# Patient Record
Sex: MALE | Race: WHITE | NOT HISPANIC OR LATINO | Employment: FULL TIME | URBAN - METROPOLITAN AREA
[De-identification: names, ages, dates, MRNs, and addresses within clinical notes are randomized per-mention and may not be internally consistent; named-entity substitution may affect disease eponyms.]

---

## 2023-12-11 ENCOUNTER — NURSE TRIAGE (OUTPATIENT)
Dept: NURSING | Facility: CLINIC | Age: 28
End: 2023-12-11

## 2023-12-11 LAB
ANION GAP SERPL CALCULATED.3IONS-SCNC: 11 MMOL/L (ref 7–15)
BASOPHILS # BLD AUTO: 0.1 10E3/UL (ref 0–0.2)
BASOPHILS NFR BLD AUTO: 1 %
BUN SERPL-MCNC: 14.9 MG/DL (ref 6–20)
CALCIUM SERPL-MCNC: 8.7 MG/DL (ref 8.6–10)
CHLORIDE SERPL-SCNC: 103 MMOL/L (ref 98–107)
CREAT SERPL-MCNC: 0.71 MG/DL (ref 0.67–1.17)
DEPRECATED HCO3 PLAS-SCNC: 23 MMOL/L (ref 22–29)
EGFRCR SERPLBLD CKD-EPI 2021: >90 ML/MIN/1.73M2
EOSINOPHIL # BLD AUTO: 0.2 10E3/UL (ref 0–0.7)
EOSINOPHIL NFR BLD AUTO: 2 %
ERYTHROCYTE [DISTWIDTH] IN BLOOD BY AUTOMATED COUNT: 11.9 % (ref 10–15)
GLUCOSE SERPL-MCNC: 100 MG/DL (ref 70–99)
HCT VFR BLD AUTO: 38.5 % (ref 40–53)
HGB BLD-MCNC: 13 G/DL (ref 13.3–17.7)
IMM GRANULOCYTES # BLD: 0.1 10E3/UL
IMM GRANULOCYTES NFR BLD: 1 %
LYMPHOCYTES # BLD AUTO: 4.7 10E3/UL (ref 0.8–5.3)
LYMPHOCYTES NFR BLD AUTO: 41 %
MCH RBC QN AUTO: 30.2 PG (ref 26.5–33)
MCHC RBC AUTO-ENTMCNC: 33.8 G/DL (ref 31.5–36.5)
MCV RBC AUTO: 90 FL (ref 78–100)
MONOCYTES # BLD AUTO: 1 10E3/UL (ref 0–1.3)
MONOCYTES NFR BLD AUTO: 8 %
NEUTROPHILS # BLD AUTO: 5.5 10E3/UL (ref 1.6–8.3)
NEUTROPHILS NFR BLD AUTO: 47 %
NRBC # BLD AUTO: 0 10E3/UL
NRBC BLD AUTO-RTO: 0 /100
PLATELET # BLD AUTO: 532 10E3/UL (ref 150–450)
POTASSIUM SERPL-SCNC: 3.9 MMOL/L (ref 3.4–5.3)
RBC # BLD AUTO: 4.3 10E6/UL (ref 4.4–5.9)
SODIUM SERPL-SCNC: 137 MMOL/L (ref 135–145)
WBC # BLD AUTO: 11.6 10E3/UL (ref 4–11)

## 2023-12-11 PROCEDURE — 36415 COLL VENOUS BLD VENIPUNCTURE: CPT | Performed by: EMERGENCY MEDICINE

## 2023-12-11 PROCEDURE — 99285 EMERGENCY DEPT VISIT HI MDM: CPT | Mod: 25

## 2023-12-11 PROCEDURE — 80048 BASIC METABOLIC PNL TOTAL CA: CPT | Performed by: EMERGENCY MEDICINE

## 2023-12-11 PROCEDURE — 87040 BLOOD CULTURE FOR BACTERIA: CPT | Performed by: EMERGENCY MEDICINE

## 2023-12-11 PROCEDURE — 85025 COMPLETE CBC W/AUTO DIFF WBC: CPT | Performed by: EMERGENCY MEDICINE

## 2023-12-12 ENCOUNTER — HOSPITAL ENCOUNTER (INPATIENT)
Facility: CLINIC | Age: 28
LOS: 1 days | Discharge: HOME | DRG: 855 | End: 2023-12-14
Attending: EMERGENCY MEDICINE | Admitting: INTERNAL MEDICINE
Payer: COMMERCIAL

## 2023-12-12 ENCOUNTER — APPOINTMENT (OUTPATIENT)
Dept: GENERAL RADIOLOGY | Facility: CLINIC | Age: 28
DRG: 855 | End: 2023-12-12
Attending: EMERGENCY MEDICINE
Payer: COMMERCIAL

## 2023-12-12 DIAGNOSIS — L03.011 CELLULITIS OF FINGER OF RIGHT HAND: ICD-10-CM
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
## 2023-12-12 DIAGNOSIS — I89.1 LYMPHANGITIS: ICD-10-CM

## 2023-12-12 LAB
ANION GAP SERPL CALCULATED.3IONS-SCNC: 10 MMOL/L (ref 7–15)
BASOPHILS # BLD AUTO: 0.1 10E3/UL (ref 0–0.2)
BASOPHILS NFR BLD AUTO: 1 %
BUN SERPL-MCNC: 10.8 MG/DL (ref 6–20)
CALCIUM SERPL-MCNC: 8.3 MG/DL (ref 8.6–10)
CHLORIDE SERPL-SCNC: 105 MMOL/L (ref 98–107)
CREAT SERPL-MCNC: 0.64 MG/DL (ref 0.67–1.17)
DEPRECATED HCO3 PLAS-SCNC: 25 MMOL/L (ref 22–29)
EGFRCR SERPLBLD CKD-EPI 2021: >90 ML/MIN/1.73M2
EOSINOPHIL # BLD AUTO: 0.2 10E3/UL (ref 0–0.7)
EOSINOPHIL NFR BLD AUTO: 2 %
ERYTHROCYTE [DISTWIDTH] IN BLOOD BY AUTOMATED COUNT: 11.9 % (ref 10–15)
GLUCOSE SERPL-MCNC: 116 MG/DL (ref 70–99)
HCT VFR BLD AUTO: 36.9 % (ref 40–53)
HGB BLD-MCNC: 12.5 G/DL (ref 13.3–17.7)
IMM GRANULOCYTES # BLD: 0.1 10E3/UL
IMM GRANULOCYTES NFR BLD: 1 %
LACTATE SERPL-SCNC: 1.1 MMOL/L (ref 0.7–2)
LYMPHOCYTES # BLD AUTO: 3.7 10E3/UL (ref 0.8–5.3)
LYMPHOCYTES NFR BLD AUTO: 30 %
MCH RBC QN AUTO: 30.1 PG (ref 26.5–33)
MCHC RBC AUTO-ENTMCNC: 33.9 G/DL (ref 31.5–36.5)
MCV RBC AUTO: 89 FL (ref 78–100)
MONOCYTES # BLD AUTO: 1.1 10E3/UL (ref 0–1.3)
MONOCYTES NFR BLD AUTO: 9 %
NEUTROPHILS # BLD AUTO: 6.9 10E3/UL (ref 1.6–8.3)
NEUTROPHILS NFR BLD AUTO: 57 %
NRBC # BLD AUTO: 0 10E3/UL
NRBC BLD AUTO-RTO: 0 /100
PLATELET # BLD AUTO: 497 10E3/UL (ref 150–450)
POTASSIUM SERPL-SCNC: 3.7 MMOL/L (ref 3.4–5.3)
RBC # BLD AUTO: 4.15 10E6/UL (ref 4.4–5.9)
SODIUM SERPL-SCNC: 140 MMOL/L (ref 135–145)
WBC # BLD AUTO: 12 10E3/UL (ref 4–11)

## 2023-12-12 PROCEDURE — G0378 HOSPITAL OBSERVATION PER HR: HCPCS

## 2023-12-12 PROCEDURE — 96365 THER/PROPH/DIAG IV INF INIT: CPT

## 2023-12-12 PROCEDURE — 36415 COLL VENOUS BLD VENIPUNCTURE: CPT | Performed by: INTERNAL MEDICINE

## 2023-12-12 PROCEDURE — 250N000011 HC RX IP 250 OP 636: Mod: JZ | Performed by: INTERNAL MEDICINE

## 2023-12-12 PROCEDURE — 83605 ASSAY OF LACTIC ACID: CPT | Performed by: EMERGENCY MEDICINE

## 2023-12-12 PROCEDURE — 87040 BLOOD CULTURE FOR BACTERIA: CPT | Performed by: EMERGENCY MEDICINE

## 2023-12-12 PROCEDURE — 250N000011 HC RX IP 250 OP 636: Performed by: EMERGENCY MEDICINE

## 2023-12-12 PROCEDURE — 73140 X-RAY EXAM OF FINGER(S): CPT | Mod: RT

## 2023-12-12 PROCEDURE — 99222 1ST HOSP IP/OBS MODERATE 55: CPT | Performed by: INTERNAL MEDICINE

## 2023-12-12 PROCEDURE — 250N000013 HC RX MED GY IP 250 OP 250 PS 637: Performed by: INTERNAL MEDICINE

## 2023-12-12 PROCEDURE — 85025 COMPLETE CBC W/AUTO DIFF WBC: CPT | Performed by: INTERNAL MEDICINE

## 2023-12-12 PROCEDURE — 96375 TX/PRO/DX INJ NEW DRUG ADDON: CPT

## 2023-12-12 PROCEDURE — 96376 TX/PRO/DX INJ SAME DRUG ADON: CPT

## 2023-12-12 PROCEDURE — 36415 COLL VENOUS BLD VENIPUNCTURE: CPT | Performed by: EMERGENCY MEDICINE

## 2023-12-12 PROCEDURE — 80048 BASIC METABOLIC PNL TOTAL CA: CPT | Performed by: INTERNAL MEDICINE

## 2023-12-12 RX ORDER — ACETAMINOPHEN 325 MG/1
650 TABLET ORAL EVERY 4 HOURS PRN
Status: DISCONTINUED | OUTPATIENT
Start: 2023-12-12 | End: 2023-12-14 | Stop reason: HOSPADM

## 2023-12-12 RX ORDER — AMOXICILLIN 250 MG
2 CAPSULE ORAL 2 TIMES DAILY PRN
Status: DISCONTINUED | OUTPATIENT
Start: 2023-12-12 | End: 2023-12-14 | Stop reason: HOSPADM

## 2023-12-12 RX ORDER — ONDANSETRON 2 MG/ML
4 INJECTION INTRAMUSCULAR; INTRAVENOUS EVERY 6 HOURS PRN
Status: DISCONTINUED | OUTPATIENT
Start: 2023-12-12 | End: 2023-12-13

## 2023-12-12 RX ORDER — OXYCODONE HYDROCHLORIDE 5 MG/1
5 TABLET ORAL EVERY 4 HOURS PRN
Status: DISCONTINUED | OUTPATIENT
Start: 2023-12-12 | End: 2023-12-13

## 2023-12-12 RX ORDER — AMPICILLIN AND SULBACTAM 2; 1 G/1; G/1
3 INJECTION, POWDER, FOR SOLUTION INTRAMUSCULAR; INTRAVENOUS EVERY 6 HOURS
Status: DISCONTINUED | OUTPATIENT
Start: 2023-12-12 | End: 2023-12-14 | Stop reason: HOSPADM

## 2023-12-12 RX ORDER — NALOXONE HYDROCHLORIDE 0.4 MG/ML
0.2 INJECTION, SOLUTION INTRAMUSCULAR; INTRAVENOUS; SUBCUTANEOUS
Status: DISCONTINUED | OUTPATIENT
Start: 2023-12-12 | End: 2023-12-14 | Stop reason: HOSPADM

## 2023-12-12 RX ORDER — ONDANSETRON 4 MG/1
4 TABLET, ORALLY DISINTEGRATING ORAL EVERY 6 HOURS PRN
Status: DISCONTINUED | OUTPATIENT
Start: 2023-12-12 | End: 2023-12-13

## 2023-12-12 RX ORDER — PIPERACILLIN SODIUM, TAZOBACTAM SODIUM 3; .375 G/15ML; G/15ML
3.38 INJECTION, POWDER, LYOPHILIZED, FOR SOLUTION INTRAVENOUS ONCE
Status: COMPLETED | OUTPATIENT
Start: 2023-12-12 | End: 2023-12-12

## 2023-12-12 RX ORDER — AMOXICILLIN 250 MG
1 CAPSULE ORAL 2 TIMES DAILY PRN
Status: DISCONTINUED | OUTPATIENT
Start: 2023-12-12 | End: 2023-12-14 | Stop reason: HOSPADM

## 2023-12-12 RX ORDER — NALOXONE HYDROCHLORIDE 0.4 MG/ML
0.4 INJECTION, SOLUTION INTRAMUSCULAR; INTRAVENOUS; SUBCUTANEOUS
Status: DISCONTINUED | OUTPATIENT
Start: 2023-12-12 | End: 2023-12-14 | Stop reason: HOSPADM

## 2023-12-12 RX ORDER — LIDOCAINE 40 MG/G
CREAM TOPICAL
Status: DISCONTINUED | OUTPATIENT
Start: 2023-12-12 | End: 2023-12-14

## 2023-12-12 RX ORDER — ACETAMINOPHEN 650 MG/1
650 SUPPOSITORY RECTAL EVERY 4 HOURS PRN
Status: DISCONTINUED | OUTPATIENT
Start: 2023-12-12 | End: 2023-12-14 | Stop reason: HOSPADM

## 2023-12-12 RX ADMIN — OXYCODONE HYDROCHLORIDE 5 MG: 5 TABLET ORAL at 06:30

## 2023-12-12 RX ADMIN — AMPICILLIN SODIUM AND SULBACTAM SODIUM 3 G: 2; 1 INJECTION, POWDER, FOR SOLUTION INTRAMUSCULAR; INTRAVENOUS at 17:51

## 2023-12-12 RX ADMIN — PIPERACILLIN AND TAZOBACTAM 3.38 G: 3; .375 INJECTION, POWDER, FOR SOLUTION INTRAVENOUS at 02:20

## 2023-12-12 RX ADMIN — ACETAMINOPHEN 650 MG: 325 TABLET, FILM COATED ORAL at 15:35

## 2023-12-12 RX ADMIN — AMPICILLIN SODIUM AND SULBACTAM SODIUM 3 G: 2; 1 INJECTION, POWDER, FOR SOLUTION INTRAMUSCULAR; INTRAVENOUS at 06:27

## 2023-12-12 RX ADMIN — ACETAMINOPHEN 650 MG: 325 TABLET, FILM COATED ORAL at 08:28

## 2023-12-12 RX ADMIN — ACETAMINOPHEN 650 MG: 325 TABLET, FILM COATED ORAL at 03:39

## 2023-12-12 RX ADMIN — AMPICILLIN SODIUM AND SULBACTAM SODIUM 3 G: 2; 1 INJECTION, POWDER, FOR SOLUTION INTRAMUSCULAR; INTRAVENOUS at 12:00

## 2023-12-12 ASSESSMENT — ACTIVITIES OF DAILY LIVING (ADL)
ADLS_ACUITY_SCORE: 19
ADLS_ACUITY_SCORE: 35
ADLS_ACUITY_SCORE: 35
ADLS_ACUITY_SCORE: 19
ADLS_ACUITY_SCORE: 19
ADLS_ACUITY_SCORE: 35
ADLS_ACUITY_SCORE: 19

## 2023-12-12 NOTE — ED NOTES
[facility]  ED Nurse Handoff Report    ED Chief complaint: Cat Bite  . ED Diagnosis:   Final diagnoses:   Cat bite, initial encounter   Cellulitis of finger of right hand   Lymphangitis       Allergies: No Known Allergies    Code Status: Full Code    Activity level - Baseline/Home:  independent.  Activity Level - Current:   independent.   Lift room needed: No.   Bariatric: No   Needed: No   Isolation: No.   Infection: Not Applicable.     Respiratory status: Room air    Vital Signs (within 30 minutes):   Vitals:    12/11/23 2124   BP: (!) 154/98   Pulse: 90   Resp: 20   Temp: 98.8  F (37.1  C)   TempSrc: Temporal   SpO2: 96%       Cardiac Rhythm:  ,      Pain level:    Patient confused: No.   Patient Falls Risk: nonskid shoes/slippers when out of bed.   Elimination Status: Has voided     Patient Report - Initial Complaint: infected cat bite.   Focused Assessment: cat bite, cellulitis, lymphangitis     Abnormal Results:   Labs Ordered and Resulted from Time of ED Arrival to Time of ED Departure   BASIC METABOLIC PANEL - Abnormal       Result Value    Sodium 137      Potassium 3.9      Chloride 103      Carbon Dioxide (CO2) 23      Anion Gap 11      Urea Nitrogen 14.9      Creatinine 0.71      GFR Estimate >90      Calcium 8.7      Glucose 100 (*)    CBC WITH PLATELETS AND DIFFERENTIAL - Abnormal    WBC Count 11.6 (*)     RBC Count 4.30 (*)     Hemoglobin 13.0 (*)     Hematocrit 38.5 (*)     MCV 90      MCH 30.2      MCHC 33.8      RDW 11.9      Platelet Count 532 (*)     % Neutrophils 47      % Lymphocytes 41      % Monocytes 8      % Eosinophils 2      % Basophils 1      % Immature Granulocytes 1      NRBCs per 100 WBC 0      Absolute Neutrophils 5.5      Absolute Lymphocytes 4.7      Absolute Monocytes 1.0      Absolute Eosinophils 0.2      Absolute Basophils 0.1      Absolute Immature Granulocytes 0.1      Absolute NRBCs 0.0     LACTIC ACID WHOLE BLOOD - Normal    Lactic Acid 1.1      BLOOD CULTURE   BLOOD CULTURE   BLOOD CULTURE        XR Finger Right G/E 2 Views   Final Result   IMPRESSION: Normal joint spaces and alignment. No fracture. Soft tissue swelling about the second digit. No soft tissue gas or radiopaque foreign body.             Treatments provided: iv abx, lab work  Family Comments:   OBS brochure/video discussed/provided to patient:  Yes  ED Medications:   Medications   lidocaine 1 % 0.1-1 mL (has no administration in time range)   lidocaine (LMX4) cream (has no administration in time range)   sodium chloride (PF) 0.9% PF flush 3 mL (3 mLs Intracatheter $Given 12/12/23 0222)   sodium chloride (PF) 0.9% PF flush 3 mL (has no administration in time range)   piperacillin-tazobactam (ZOSYN) 3.375 g vial to attach to  mL bag (0 g Intravenous Stopped 12/12/23 0306)       Drips infusing:  No  For the majority of the shift this patient was Green.   Interventions performed were .    Sepsis treatment initiated: No    Cares/treatment/interventions/medications to be completed following ED care:     ED Nurse Name: Kenia Alba RN  3:10 AM    RECEIVING UNIT ED HANDOFF REVIEW    Above ED Nurse Handoff Report was reviewed: Yes  Reviewed by: Fabiola Benedict RN on December 12, 2023 at 7:40 AM

## 2023-12-12 NOTE — ED TRIAGE NOTES
Patient received a cat bite to right index finger on 11/29/23. Was seen in UC on 12/01/23 and started a 10 day course of antibiotics. These are complete and the wound still has signs of infection: pain, swelling, redness. Had purulent drainage for several days which stopped 2 days ago. Has also noted some redness of the right bicep area.

## 2023-12-12 NOTE — TELEPHONE ENCOUNTER
Pt's spouse calling, verbal consent given to communicate.    Pt was bit by cat 2 weeks ago, was seen in , given antibiotics and tetanus vaccine.  Bite is still swollen on index finger. Tonight, notices Redness and tenderness on same arm from elbow to armpit. When extending arm, muscle feels tight.   Had a fever for 48 hours a week ago.  Currently no fever. Arm feels sore  Been feeling weak and having headache throughout the week.    Triage to ED, care advice given.    Agatha Hancock RN, BSN  12/11/2023 at 9:02 PM  [facility]        Reason for Disposition   [1] Any break in skin from BITE (e.g., cut, puncture or scratch) AND[2] PET animal (e.g., dog, cat, or ferret) at risk for RABIES (e.g., sick, stray, unprovoked bite, developing country)    Additional Information   Negative: [1] Major bleeding (e.g., actively dripping or spurting) AND [2] can't be stopped   Negative: Sounds like a life-threatening emergency to the triager   Negative: [1] Any break in skin from BITE (e.g., cut, puncture or scratch) AND[2] WILD animal at risk for RABIES (e.g., bat, raccoon, roberts, skunk, coyote, other carnivores; see Background for list.)    Protocols used: Animal Bite-A-

## 2023-12-12 NOTE — PHARMACY-ADMISSION MEDICATION HISTORY
Pharmacist Admission Medication History    Admission medication history is complete. The information provided in this note is only as accurate as the sources available at the time of the update.    Information Source(s): Patient via in-person  Pertinent Information: none    Changes made to PTA medication list:  Added: augmentin  Deleted: None  Changed: None    Medication Affordability:  Not including over the counter (OTC) medications, was there a time in the past 3 months when you did not take your medications as prescribed because of cost?: No    Allergies reviewed with patient and updates made in EHR: no  Medication History Completed By: Tunde Kaur RPH 12/12/2023 8:31 AM    Prior to Admission medications    Medication Sig Last Dose Taking? Auth Provider Long Term End Date   amoxicillin-clavulanate (AUGMENTIN) 875-125 MG tablet Take 1 tablet by mouth 2 times daily 12/11/2023 at day 9/10 Yes Unknown, Entered By History  12/12/23

## 2023-12-12 NOTE — CONSULTS
[facility]    Orthopedic Consultation    Reji Savage MRN# [id number]   Age: 28 year old YOB: 1995     Date of Admission:  12/12/2023    Reason for consult: Right index finger infection secondary to cat bite        Requesting provider: SELINA Long       Level of consult: Consult, follow and place orders           Assessment and Plan:   Assessment:   Right index finger infection secondary to cat bite, proximal lymphangitis        Plan:   Continue IV antibiotics  Begin bid hand soaks  Elevate hand when at rest  Will place NPO midnight and recheck in am            Chief Complaint:   Right index finger pain          History of Present Illness:   Reji Savage is a 28 year old male patient presented to emergency room with a complaint of right index finger swelling and pain after cat bite.  Patient stated that he was bitten by a stray kitten on his right index finger on 11/29/2023.  He states that he developed body aches and fever with right index finger swelling.  He also had discharge from the wound.  He states that purulent discharge stopped 2 days ago.  He continues to have swelling of right index finger.  He also complains of pain extending after his right arm with some red streaks.  Laboratory workup showed normal BMP.  WBC 11.6, hemoglobin 13.0.  X-ray of the right index finger showed normal joint space.  There is no fracture.  There is evidence of soft tissue swelling on the index finger.  There is no evidence of gas or foreign body.          Past Medical History:   No past medical history on file.          Past Surgical History:   No past surgical history on file.          Social History:     Social History     Tobacco Use    Smoking status: Not on file    Smokeless tobacco: Not on file   Substance Use Topics    Alcohol use: Not on file             Family History:   No family history on file.          Immunizations:     VACCINE/DOSE   Diptheria   DPT   DTAP   HBIG  "  Hepatitis A   Hepatitis B   HIB   Influenza   Measles   Meningococcal   MMR   Mumps   Pneumococcal   Polio   Rubella   Small Pox   TDAP   Varicella   Zoster             Allergies:   No Known Allergies          Medications:     Current Facility-Administered Medications   Medication    acetaminophen (TYLENOL) tablet 650 mg    Or    acetaminophen (TYLENOL) Suppository 650 mg    ampicillin-sulbactam (UNASYN) 3 g vial to attach to  mL bag    lidocaine (LMX4) cream    lidocaine 1 % 0.1-1 mL    naloxone (NARCAN) injection 0.2 mg    Or    naloxone (NARCAN) injection 0.4 mg    Or    naloxone (NARCAN) injection 0.2 mg    Or    naloxone (NARCAN) injection 0.4 mg    ondansetron (ZOFRAN ODT) ODT tab 4 mg    Or    ondansetron (ZOFRAN) injection 4 mg    oxyCODONE (ROXICODONE) tablet 5 mg    oxyCODONE IR (ROXICODONE) half-tab 2.5 mg    senna-docusate (SENOKOT-S/PERICOLACE) 8.6-50 MG per tablet 1 tablet    Or    senna-docusate (SENOKOT-S/PERICOLACE) 8.6-50 MG per tablet 2 tablet    sodium chloride (PF) 0.9% PF flush 3 mL    sodium chloride (PF) 0.9% PF flush 3 mL             Review of Systems:   ROS:  10 point ROS neg other than the symptoms noted above in the HPI.            Physical Exam:   All vitals have been reviewed  Patient Vitals for the past 24 hrs:   BP Temp Temp src Pulse Resp SpO2 Height Weight   12/12/23 1245 128/75 98  F (36.7  C) Oral 60 16 98 % -- --   12/12/23 0812 126/79 -- -- 72 -- 97 % 1.956 m (6' 5\") (!) 151 kg (333 lb)   12/12/23 0756 126/70 99.1  F (37.3  C) Oral 76 16 98 % -- --   12/12/23 0342 136/69 99.1  F (37.3  C) Oral 76 16 95 % -- --   12/11/23 2124 (!) 154/98 98.8  F (37.1  C) Temporal 90 20 96 % -- --     No intake or output data in the 24 hours ending 12/12/23 1408      Physical Exam   Temp: 98  F (36.7  C) Temp src: Oral BP: 128/75 Pulse: 60   Resp: 16 SpO2: 98 % O2 Device: None (Room air)    Vital Signs with Ranges  Temp:  [98  F (36.7  C)-99.1  F (37.3  C)] 98  F (36.7  C)  Pulse:  " [60-90] 60  Resp:  [16-20] 16  BP: (126-154)/(69-98) 128/75  SpO2:  [95 %-98 %] 98 %  333 lbs 0 oz    Constitutional: Alert, appropriate, following commands.  HEENT: Head atraumatic normocephalic. Pupils equal round and reactive to light.  Respiratory: Unlabored breathing no audible wheeze  Cardiovascular: Regular rate and rhythm per pulses  GI: Abdomen non-distended.  Lymph/Hematologic: No lymphadenopathy in areas examined  Genitourinary:  No redmond  Skin: No rashes, no cyanosis, no edema.  Musculoskeletal: On exam of the right index finger: there is a bite jonathan present along the palmar side of the finger between the PIP and DIP joints.  No active drainage.  There is surrounding erythema.  Lymphangitis present along the proximal arm with tenderness.  No joint involvement.  Denies tenderness along the DIP and PIP.  Denies pain with resisted finger flexion and extension.  Sensation intact.  Brisk cap refill.    Neurologic: normal without focal findings, mental status, speech normal, alert and oriented x iii  Neuropsychiatric: stable            Data:   All laboratory data reviewed  Results for orders placed or performed during the hospital encounter of 12/12/23   XR Finger Right G/E 2 Views     Status: None    Narrative    EXAM: XR FINGER RIGHT G/E 2 VIEWS  LOCATION: [facility]  DATE: 12/12/2023    INDICATION: cat bite with infection  COMPARISON: None.      Impression    IMPRESSION: Normal joint spaces and alignment. No fracture. Soft tissue swelling about the second digit. No soft tissue gas or radiopaque foreign body.     Basic metabolic panel (BMP)     Status: Abnormal   Result Value Ref Range    Sodium 137 135 - 145 mmol/L    Potassium 3.9 3.4 - 5.3 mmol/L    Chloride 103 98 - 107 mmol/L    Carbon Dioxide (CO2) 23 22 - 29 mmol/L    Anion Gap 11 7 - 15 mmol/L    Urea Nitrogen 14.9 6.0 - 20.0 mg/dL    Creatinine 0.71 0.67 - 1.17 mg/dL    GFR Estimate >90 >60 mL/min/1.73m2    Calcium 8.7 8.6 -  10.0 mg/dL    Glucose 100 (H) 70 - 99 mg/dL   CBC with platelets and differential     Status: Abnormal   Result Value Ref Range    WBC Count 11.6 (H) 4.0 - 11.0 10e3/uL    RBC Count 4.30 (L) 4.40 - 5.90 10e6/uL    Hemoglobin 13.0 (L) 13.3 - 17.7 g/dL    Hematocrit 38.5 (L) 40.0 - 53.0 %    MCV 90 78 - 100 fL    MCH 30.2 26.5 - 33.0 pg    MCHC 33.8 31.5 - 36.5 g/dL    RDW 11.9 10.0 - 15.0 %    Platelet Count 532 (H) 150 - 450 10e3/uL    % Neutrophils 47 %    % Lymphocytes 41 %    % Monocytes 8 %    % Eosinophils 2 %    % Basophils 1 %    % Immature Granulocytes 1 %    NRBCs per 100 WBC 0 <1 /100    Absolute Neutrophils 5.5 1.6 - 8.3 10e3/uL    Absolute Lymphocytes 4.7 0.8 - 5.3 10e3/uL    Absolute Monocytes 1.0 0.0 - 1.3 10e3/uL    Absolute Eosinophils 0.2 0.0 - 0.7 10e3/uL    Absolute Basophils 0.1 0.0 - 0.2 10e3/uL    Absolute Immature Granulocytes 0.1 <=0.4 10e3/uL    Absolute NRBCs 0.0 10e3/uL   Lactic acid whole blood     Status: Normal   Result Value Ref Range    Lactic Acid 1.1 0.7 - 2.0 mmol/L   CBC with platelets and differential     Status: Abnormal   Result Value Ref Range    WBC Count 12.0 (H) 4.0 - 11.0 10e3/uL    RBC Count 4.15 (L) 4.40 - 5.90 10e6/uL    Hemoglobin 12.5 (L) 13.3 - 17.7 g/dL    Hematocrit 36.9 (L) 40.0 - 53.0 %    MCV 89 78 - 100 fL    MCH 30.1 26.5 - 33.0 pg    MCHC 33.9 31.5 - 36.5 g/dL    RDW 11.9 10.0 - 15.0 %    Platelet Count 497 (H) 150 - 450 10e3/uL    % Neutrophils 57 %    % Lymphocytes 30 %    % Monocytes 9 %    % Eosinophils 2 %    % Basophils 1 %    % Immature Granulocytes 1 %    NRBCs per 100 WBC 0 <1 /100    Absolute Neutrophils 6.9 1.6 - 8.3 10e3/uL    Absolute Lymphocytes 3.7 0.8 - 5.3 10e3/uL    Absolute Monocytes 1.1 0.0 - 1.3 10e3/uL    Absolute Eosinophils 0.2 0.0 - 0.7 10e3/uL    Absolute Basophils 0.1 0.0 - 0.2 10e3/uL    Absolute Immature Granulocytes 0.1 <=0.4 10e3/uL    Absolute NRBCs 0.0 10e3/uL   Basic metabolic panel     Status: Abnormal   Result Value Ref  Range    Sodium 140 135 - 145 mmol/L    Potassium 3.7 3.4 - 5.3 mmol/L    Chloride 105 98 - 107 mmol/L    Carbon Dioxide (CO2) 25 22 - 29 mmol/L    Anion Gap 10 7 - 15 mmol/L    Urea Nitrogen 10.8 6.0 - 20.0 mg/dL    Creatinine 0.64 (L) 0.67 - 1.17 mg/dL    GFR Estimate >90 >60 mL/min/1.73m2    Calcium 8.3 (L) 8.6 - 10.0 mg/dL    Glucose 116 (H) 70 - 99 mg/dL   CBC + differential     Status: Abnormal    Narrative    The following orders were created for panel order CBC + differential.  Procedure                               Abnormality         Status                     ---------                               -----------         ------                     CBC with platelets and d...[[id number]]  Abnormal            Final result                 Please view results for these tests on the individual orders.   CBC with platelets differential     Status: Abnormal    Narrative    The following orders were created for panel order CBC with platelets differential.  Procedure                               Abnormality         Status                     ---------                               -----------         ------                     CBC with platelets and d...[[id number]]  Abnormal            Final result                 Please view results for these tests on the individual orders.          Attestation:  I have reviewed today's vital signs, notes, medications, labs and imaging with Dr. Kwan.  Amount of time performed on this consult: 45 minutes.    Aura Brock PA-C

## 2023-12-12 NOTE — ED PROVIDER NOTES
History     Chief Complaint:  Cat Bite       HPI   Reji Savage is a 28 year old male who was bitten by a stray kitten on 11/29/2023 he was bit on his right second finger over the PIP joint he developed fever body aches the cat was taken to a vet and was observed.  The patient went to urgent care on the 12th started Augmentin reported he had a fever a week ago and then again on Friday he has noticed red streaks traveling up his right affected arm has had malaise fevers and therefore presented to the ER.  He is tolerating pain from the finger he has not had an x-ray of that joint.      Independent Historian:    Significant other    Review of External Notes:  Urgent care note dated 12/2/2023    Medications:    Finish course of Augmentin    Past Medical History:    Cat bite        Physical Exam   Patient Vitals for the past 24 hrs:   BP Temp Temp src Pulse Resp SpO2   12/11/23 2124 (!) 154/98 98.8  F (37.1  C) Temporal 90 20 96 %        Physical Exam  General:  No respiratory distress    Cardiovascular: Good pulses.    Respiratory: Breathing non labored.     Musculoskeletal: No tenderness.  He has no significant pain with passive flexion extension of his right second finger DIP.  He has limitation of flexion actively.    Skin: Over the palmar PIP there is an area of fluctuance redness does not extend distal nor proximal.  It is tender to palpation.  There is an area of redness extending from his right axilla down the ventral surface of his right arm.    Neurologic: He is alert with adequate strength    Psychiatric: Appropriate     Emergency Department Course       Imaging:  XR Finger Right G/E 2 Views   Final Result   IMPRESSION: Normal joint spaces and alignment. No fracture. Soft tissue swelling about the second digit. No soft tissue gas or radiopaque foreign body.           Report per radiology    Laboratory:  Labs Ordered and Resulted from Time of ED Arrival to Time of ED Departure   BASIC METABOLIC PANEL  - Abnormal       Result Value    Sodium 137      Potassium 3.9      Chloride 103      Carbon Dioxide (CO2) 23      Anion Gap 11      Urea Nitrogen 14.9      Creatinine 0.71      GFR Estimate >90      Calcium 8.7      Glucose 100 (*)    CBC WITH PLATELETS AND DIFFERENTIAL - Abnormal    WBC Count 11.6 (*)     RBC Count 4.30 (*)     Hemoglobin 13.0 (*)     Hematocrit 38.5 (*)     MCV 90      MCH 30.2      MCHC 33.8      RDW 11.9      Platelet Count 532 (*)     % Neutrophils 47      % Lymphocytes 41      % Monocytes 8      % Eosinophils 2      % Basophils 1      % Immature Granulocytes 1      NRBCs per 100 WBC 0      Absolute Neutrophils 5.5      Absolute Lymphocytes 4.7      Absolute Monocytes 1.0      Absolute Eosinophils 0.2      Absolute Basophils 0.1      Absolute Immature Granulocytes 0.1      Absolute NRBCs 0.0     LACTIC ACID WHOLE BLOOD - Normal    Lactic Acid 1.1     BLOOD CULTURE   BLOOD CULTURE   BLOOD CULTURE        Procedures       Emergency Department Course & Assessments:             Interventions:  Medications   lidocaine 1 % 0.1-1 mL (has no administration in time range)   lidocaine (LMX4) cream (has no administration in time range)   sodium chloride (PF) 0.9% PF flush 3 mL (3 mLs Intracatheter $Given 12/12/23 0222)   sodium chloride (PF) 0.9% PF flush 3 mL (has no administration in time range)   piperacillin-tazobactam (ZOSYN) 3.375 g vial to attach to  mL bag (3.375 g Intravenous $New Bag 12/12/23 0220)        Assessments:      Independent Interpretation (X-rays, CTs, rhythm strip):  I reviewed the x-ray and do not see signs of fracture nor foreign body    Consultations/Discussion of Management or Tests:  0 226 I discussed the case with Dr. Long the we reviewed imaging and antibiotic selection            Disposition:  The patient was discharged to home.     Impression & Plan        Medical Decision Making:  The patient does not look toxic and does not have an elevated lactic acid however is  concerning that he has had an elevation of his fever has clear signs of infection of the finger from a cat bite with lymphangitis spreading up his arms.  With the patient having high fever I was concerned that he has failed outpatient antibiotics.  I do not think he has infection of the flexor tendon sheath but he does require antibiotic coverage and x-ray started here was cultured and the patient was admitted in good condition.        Diagnosis:    ICD-10-CM    1. Cat bite, initial encounter  W55.01XA       2. Cellulitis of finger of right hand  L03.011       3. Lymphangitis  I89.1                     12/12/2023   Nile Lang MD Farnan, Christopher M, MD  12/12/23 0226

## 2023-12-12 NOTE — H&P
[facility]    History and Physical  Hospitalist       Date of Admission:  12/12/2023  Date of Service (when I saw the patient): 12/12/23    Assessment & Plan   Reji Savage is a 28 year old male patient with no significant past medical history came to emergency room with a complaint of right index finger swelling and pain after cat bite.  Patient stated that he was bitten by a stray kitten on his right index finger on 11/29/2023.  He states that he developed body aches and fever with right index finger swelling.  He also had discharge from the wound.  He states that purulent discharge stopped 2 days ago.  He continues to have swelling of right index finger.  He also complains of pain extending after his right arm with some red streaks.  In the ER, his vital signs showed temperature 98.8, pulse 90, blood pressure 154/98, oxygen saturation 96% on room air.  Laboratory workup showed normal BMP.  WBC 11.6, hemoglobin 13.0.  X-ray of the right index finger showed normal joint space.  There is no fracture.  There is evidence of soft tissue swelling on the index finger.  There is no evidence of gas or foreign body.  In the ER, he was given a dose of IV Zosyn.  He was admitted for further management.    Cat bite with right index finger infection with right upper extremity lymphangitis  Patient does have pain on right index finger with swelling.  No purulent discharge.  He has evidence of lymphangitis.  X-ray of right index finger shows soft tissue swelling without bony abnormalities.  Has mild leukocytosis.  Started on IV Zosyn in the ER.  Will put him on IV Unasyn 3 g IV every 6 hours.  Will order Tylenol and oral oxycodone as needed for pain      DVT Prophylaxis: Ambulate every shift  Code Status: Full Code    Disposition: Expected discharge in 1-2 days     Antione Long MD    Primary Care Physician   Physician No Ref-Primary    Chief Complaint   Right index finger pain, cat bite    History  is obtained from the patient    History of Present Illness   Reji Savage is a 28 year old male patient with no significant past medical history came to emergency room with a complaint of right index finger swelling and pain after cat bite.  Patient stated that he was bitten by a stray kitten on his right index finger on 11/29/2023.  He states that he developed body aches and fever with right index finger swelling.  He also had discharge from the wound.  He states that purulent discharge stopped 2 days ago.  He states that he was seen at urgent care on 12/01/2023 and started on 10-day course of Augmentin.  He states that he completed course of antibiotic.  He states that he still has pain and redness.  He also noticed red streaks extending to his right arm associated with pain.  He has no nausea, vomiting, abdominal pain, urinary symptoms.  He denies fever today.  On arrival to emergency room, his vital signs showed temperature 98.8, pulse 90, blood pressure 154/98, oxygen saturation 96% on room air.  Laboratory workup showed normal BMP.  WBC 11.6, hemoglobin 13.0.  X-ray of the right index finger showed normal joint space.  There is no fracture.  There is evidence of soft tissue swelling on the index finger.  There is no evidence of gas or foreign body.  In the ER, he was given a dose of IV Zosyn.  He was admitted for further management.    Past Medical History    I have reviewed this patient's medical history and updated it with pertinent information if needed.   No past medical history on file.    Past Surgical History   I have reviewed this patient's surgical history and updated it with pertinent information if needed.  No past surgical history on file.    Prior to Admission Medications   None     Allergies   No Known Allergies    Social History   I have reviewed this patient's social history and updated it with pertinent information if needed. Reji Savage      Family History   I have reviewed this  patient's family history and updated it with pertinent information if needed.   No family history on file.    Review of Systems   The 10 point Review of Systems is negative other than noted in the HPI or here. Right index finger pain    Physical Exam   Temp: 98.8  F (37.1  C) Temp src: Temporal BP: (!) 154/98 Pulse: 90   Resp: 20 SpO2: 96 % O2 Device: None (Room air)    Vital Signs with Ranges  Temp:  [98.8  F (37.1  C)] 98.8  F (37.1  C)  Pulse:  [90] 90  Resp:  [20] 20  BP: (154)/(98) 154/98  SpO2:  [96 %] 96 %  0 lbs 0 oz    GEN:  Alert, oriented x 3, appears comfortable, NAD.  HEENT:  Normocephalic/atraumatic, no scleral icterus, no nasal discharge, mouth moist.  CV:  Regular rate and rhythm, no murmur or JVD.  S1 + S2 noted, no S3 or S4.  LUNGS:  Clear to auscultation bilaterally without rales/rhonchi/wheezing/retractions.  Symmetric chest rise on inhalation noted.  ABD:  Active bowel sounds, soft, non-tender/non-distended.  No rebound/guarding/rigidity.  EXT:  No edema or cyanosis.  Swelling of right index finger  SKIN:  Dry to touch, no exanthems noted in the visualized areas.  NEURO:  Symmetric muscle strength, sensation to touch grossly intact.  No new focal deficits appreciated.    Data   Data reviewed today:  I personally reviewed no images or EKG's today.  Recent Labs   Lab 12/11/23  2158   WBC 11.6*   HGB 13.0*   MCV 90   *      POTASSIUM 3.9   CHLORIDE 103   CO2 23   BUN 14.9   CR 0.71   ANIONGAP 11   LOBITO 8.7   *       Recent Results (from the past 24 hour(s))   XR Finger Right G/E 2 Views    Narrative    EXAM: XR FINGER RIGHT G/E 2 VIEWS  LOCATION: [facility]  DATE: 12/12/2023    INDICATION: cat bite with infection  COMPARISON: None.      Impression    IMPRESSION: Normal joint spaces and alignment. No fracture. Soft tissue swelling about the second digit. No soft tissue gas or radiopaque foreign body.

## 2023-12-12 NOTE — PROGRESS NOTES
PRIMARY DIAGNOSIS: ACUTE PAIN/INFECTION  OUTPATIENT/OBSERVATION GOALS TO BE MET BEFORE DISCHARGE:  1. Pain Status: Improved-controlled with oral pain medications.    2. Return to near baseline physical activity: Yes    3. Cleared for discharge by consultants (if involved): No    Discharge Planner Nurse   Safe discharge environment identified: Yes  Barriers to discharge: Yes       Entered by: Fabiola Benedict RN 12/12/2023 4:32 PM  Pt admitted from ED today at 0830 with right index finger infection from a cat bite. He is A/O x4, up independent in the room. Pt is receiving Unasyn for ABX. R finger is red/swollen and bite jonathan seen on finger, also redness present to inside of RUE. Tylenol given for headache. VSS, afebrile.   Please review provider order for any additional goals.   Nurse to notify provider when observation goals have been met and patient is ready for discharge.

## 2023-12-12 NOTE — PROGRESS NOTES
PRIMARY DIAGNOSIS: ACUTE PAIN/INFECTION  OUTPATIENT/OBSERVATION GOALS TO BE MET BEFORE DISCHARGE:  1. Pain Status: Improved-controlled with oral pain medications.     2. Return to near baseline physical activity: Yes     3. Cleared for discharge by consultants (if involved): No        Discharge Planner Nurse   Safe discharge environment identified: Yes  Barriers to discharge: Yes    Pt admitted with right index finger infection from a cat bite. He is A/O x4, up independent in the room. Pt is receiving Unasyn for ABX. R finger is red/swollen and bite jonathan seen on finger, also redness present to inside of RUE. VSS, afebrile. Seen by Orthopedics who recommend soaking BID with warm water and Chlorhexidine. First soak completed. NPO at midnight.    Please review provider order for any additional goals.   Nurse to notify provider when observation goals have been met and patient is ready for discharge.

## 2023-12-13 ENCOUNTER — ANESTHESIA (OUTPATIENT)
Dept: SURGERY | Facility: CLINIC | Age: 28
DRG: 855 | End: 2023-12-13
Payer: COMMERCIAL

## 2023-12-13 ENCOUNTER — ANESTHESIA EVENT (OUTPATIENT)
Dept: SURGERY | Facility: CLINIC | Age: 28
DRG: 855 | End: 2023-12-13
Payer: COMMERCIAL

## 2023-12-13 LAB
ANION GAP SERPL CALCULATED.3IONS-SCNC: 9 MMOL/L (ref 7–15)
BUN SERPL-MCNC: 7.9 MG/DL (ref 6–20)
CALCIUM SERPL-MCNC: 8.3 MG/DL (ref 8.6–10)
CHLORIDE SERPL-SCNC: 103 MMOL/L (ref 98–107)
CREAT SERPL-MCNC: 0.67 MG/DL (ref 0.67–1.17)
DEPRECATED HCO3 PLAS-SCNC: 25 MMOL/L (ref 22–29)
EGFRCR SERPLBLD CKD-EPI 2021: >90 ML/MIN/1.73M2
ERYTHROCYTE [DISTWIDTH] IN BLOOD BY AUTOMATED COUNT: 11.9 % (ref 10–15)
GLUCOSE SERPL-MCNC: 110 MG/DL (ref 70–99)
GRAM STAIN RESULT: NORMAL
GRAM STAIN RESULT: NORMAL
HCT VFR BLD AUTO: 36.8 % (ref 40–53)
HGB BLD-MCNC: 12.3 G/DL (ref 13.3–17.7)
MCH RBC QN AUTO: 30.1 PG (ref 26.5–33)
MCHC RBC AUTO-ENTMCNC: 33.4 G/DL (ref 31.5–36.5)
MCV RBC AUTO: 90 FL (ref 78–100)
PLATELET # BLD AUTO: 466 10E3/UL (ref 150–450)
POTASSIUM SERPL-SCNC: 3.9 MMOL/L (ref 3.4–5.3)
RBC # BLD AUTO: 4.09 10E6/UL (ref 4.4–5.9)
SODIUM SERPL-SCNC: 137 MMOL/L (ref 135–145)
WBC # BLD AUTO: 15 10E3/UL (ref 4–11)

## 2023-12-13 PROCEDURE — 87205 SMEAR GRAM STAIN: CPT | Performed by: ORTHOPAEDIC SURGERY

## 2023-12-13 PROCEDURE — 250N000009 HC RX 250: Performed by: ORTHOPAEDIC SURGERY

## 2023-12-13 PROCEDURE — 360N000076 HC SURGERY LEVEL 3, PER MIN: Performed by: ORTHOPAEDIC SURGERY

## 2023-12-13 PROCEDURE — 0J9J0ZZ DRAINAGE OF RIGHT HAND SUBCUTANEOUS TISSUE AND FASCIA, OPEN APPROACH: ICD-10-PCS | Performed by: ORTHOPAEDIC SURGERY

## 2023-12-13 PROCEDURE — 250N000011 HC RX IP 250 OP 636: Mod: JZ | Performed by: INTERNAL MEDICINE

## 2023-12-13 PROCEDURE — 250N000011 HC RX IP 250 OP 636: Performed by: ORTHOPAEDIC SURGERY

## 2023-12-13 PROCEDURE — 96376 TX/PRO/DX INJ SAME DRUG ADON: CPT

## 2023-12-13 PROCEDURE — 0JBJ0ZZ EXCISION OF RIGHT HAND SUBCUTANEOUS TISSUE AND FASCIA, OPEN APPROACH: ICD-10-PCS | Performed by: ORTHOPAEDIC SURGERY

## 2023-12-13 PROCEDURE — 99233 SBSQ HOSP IP/OBS HIGH 50: CPT | Performed by: STUDENT IN AN ORGANIZED HEALTH CARE EDUCATION/TRAINING PROGRAM

## 2023-12-13 PROCEDURE — 258N000003 HC RX IP 258 OP 636: Performed by: PHYSICIAN ASSISTANT

## 2023-12-13 PROCEDURE — 120N000001 HC R&B MED SURG/OB

## 2023-12-13 PROCEDURE — 999N000141 HC STATISTIC PRE-PROCEDURE NURSING ASSESSMENT: Performed by: ORTHOPAEDIC SURGERY

## 2023-12-13 PROCEDURE — 250N000013 HC RX MED GY IP 250 OP 250 PS 637: Performed by: PHYSICIAN ASSISTANT

## 2023-12-13 PROCEDURE — 87070 CULTURE OTHR SPECIMN AEROBIC: CPT | Performed by: ORTHOPAEDIC SURGERY

## 2023-12-13 PROCEDURE — 36415 COLL VENOUS BLD VENIPUNCTURE: CPT | Performed by: STUDENT IN AN ORGANIZED HEALTH CARE EDUCATION/TRAINING PROGRAM

## 2023-12-13 PROCEDURE — 250N000011 HC RX IP 250 OP 636: Performed by: NURSE ANESTHETIST, CERTIFIED REGISTERED

## 2023-12-13 PROCEDURE — 710N000012 HC RECOVERY PHASE 2, PER MINUTE: Performed by: ORTHOPAEDIC SURGERY

## 2023-12-13 PROCEDURE — 250N000009 HC RX 250: Performed by: NURSE ANESTHETIST, CERTIFIED REGISTERED

## 2023-12-13 PROCEDURE — G0378 HOSPITAL OBSERVATION PER HR: HCPCS

## 2023-12-13 PROCEDURE — 250N000013 HC RX MED GY IP 250 OP 250 PS 637: Performed by: INTERNAL MEDICINE

## 2023-12-13 PROCEDURE — 80048 BASIC METABOLIC PNL TOTAL CA: CPT | Performed by: STUDENT IN AN ORGANIZED HEALTH CARE EDUCATION/TRAINING PROGRAM

## 2023-12-13 PROCEDURE — 85027 COMPLETE CBC AUTOMATED: CPT | Performed by: STUDENT IN AN ORGANIZED HEALTH CARE EDUCATION/TRAINING PROGRAM

## 2023-12-13 PROCEDURE — 87075 CULTR BACTERIA EXCEPT BLOOD: CPT | Performed by: ORTHOPAEDIC SURGERY

## 2023-12-13 PROCEDURE — 258N000003 HC RX IP 258 OP 636: Performed by: NURSE ANESTHETIST, CERTIFIED REGISTERED

## 2023-12-13 PROCEDURE — 272N000001 HC OR GENERAL SUPPLY STERILE: Performed by: ORTHOPAEDIC SURGERY

## 2023-12-13 PROCEDURE — 370N000017 HC ANESTHESIA TECHNICAL FEE, PER MIN: Performed by: ORTHOPAEDIC SURGERY

## 2023-12-13 RX ORDER — ONDANSETRON 2 MG/ML
INJECTION INTRAMUSCULAR; INTRAVENOUS PRN
Status: DISCONTINUED | OUTPATIENT
Start: 2023-12-13 | End: 2023-12-13

## 2023-12-13 RX ORDER — KETAMINE HYDROCHLORIDE 10 MG/ML
INJECTION INTRAMUSCULAR; INTRAVENOUS PRN
Status: DISCONTINUED | OUTPATIENT
Start: 2023-12-13 | End: 2023-12-13

## 2023-12-13 RX ORDER — SODIUM CHLORIDE, SODIUM LACTATE, POTASSIUM CHLORIDE, CALCIUM CHLORIDE 600; 310; 30; 20 MG/100ML; MG/100ML; MG/100ML; MG/100ML
INJECTION, SOLUTION INTRAVENOUS CONTINUOUS
Status: DISCONTINUED | OUTPATIENT
Start: 2023-12-13 | End: 2023-12-13 | Stop reason: HOSPADM

## 2023-12-13 RX ORDER — POLYETHYLENE GLYCOL 3350 17 G/17G
17 POWDER, FOR SOLUTION ORAL DAILY
Status: DISCONTINUED | OUTPATIENT
Start: 2023-12-14 | End: 2023-12-14 | Stop reason: HOSPADM

## 2023-12-13 RX ORDER — OXYCODONE HYDROCHLORIDE 5 MG/1
5 TABLET ORAL EVERY 4 HOURS PRN
Status: DISCONTINUED | OUTPATIENT
Start: 2023-12-13 | End: 2023-12-14 | Stop reason: HOSPADM

## 2023-12-13 RX ORDER — ONDANSETRON 2 MG/ML
4 INJECTION INTRAMUSCULAR; INTRAVENOUS EVERY 6 HOURS PRN
Status: DISCONTINUED | OUTPATIENT
Start: 2023-12-13 | End: 2023-12-14 | Stop reason: HOSPADM

## 2023-12-13 RX ORDER — ONDANSETRON 2 MG/ML
4 INJECTION INTRAMUSCULAR; INTRAVENOUS EVERY 30 MIN PRN
Status: DISCONTINUED | OUTPATIENT
Start: 2023-12-13 | End: 2023-12-13 | Stop reason: HOSPADM

## 2023-12-13 RX ORDER — GLYCOPYRROLATE 0.2 MG/ML
INJECTION, SOLUTION INTRAMUSCULAR; INTRAVENOUS PRN
Status: DISCONTINUED | OUTPATIENT
Start: 2023-12-13 | End: 2023-12-13

## 2023-12-13 RX ORDER — BUPIVACAINE HYDROCHLORIDE 5 MG/ML
INJECTION, SOLUTION EPIDURAL; INTRACAUDAL PRN
Status: DISCONTINUED | OUTPATIENT
Start: 2023-12-13 | End: 2023-12-13 | Stop reason: HOSPADM

## 2023-12-13 RX ORDER — SODIUM CHLORIDE, SODIUM LACTATE, POTASSIUM CHLORIDE, CALCIUM CHLORIDE 600; 310; 30; 20 MG/100ML; MG/100ML; MG/100ML; MG/100ML
INJECTION, SOLUTION INTRAVENOUS CONTINUOUS PRN
Status: DISCONTINUED | OUTPATIENT
Start: 2023-12-13 | End: 2023-12-13

## 2023-12-13 RX ORDER — FENTANYL CITRATE 50 UG/ML
50 INJECTION, SOLUTION INTRAMUSCULAR; INTRAVENOUS EVERY 5 MIN PRN
Status: DISCONTINUED | OUTPATIENT
Start: 2023-12-13 | End: 2023-12-13 | Stop reason: HOSPADM

## 2023-12-13 RX ORDER — HYDROMORPHONE HCL IN WATER/PF 6 MG/30 ML
0.4 PATIENT CONTROLLED ANALGESIA SYRINGE INTRAVENOUS
Status: DISCONTINUED | OUTPATIENT
Start: 2023-12-13 | End: 2023-12-14 | Stop reason: HOSPADM

## 2023-12-13 RX ORDER — OXYCODONE HYDROCHLORIDE 5 MG/1
10 TABLET ORAL EVERY 4 HOURS PRN
Status: DISCONTINUED | OUTPATIENT
Start: 2023-12-13 | End: 2023-12-14 | Stop reason: HOSPADM

## 2023-12-13 RX ORDER — CEFAZOLIN SODIUM 2 G/100ML
2 INJECTION, SOLUTION INTRAVENOUS EVERY 8 HOURS
Status: DISCONTINUED | OUTPATIENT
Start: 2023-12-13 | End: 2023-12-13 | Stop reason: ALTCHOICE

## 2023-12-13 RX ORDER — ONDANSETRON 4 MG/1
4 TABLET, ORALLY DISINTEGRATING ORAL EVERY 30 MIN PRN
Status: DISCONTINUED | OUTPATIENT
Start: 2023-12-13 | End: 2023-12-13 | Stop reason: HOSPADM

## 2023-12-13 RX ORDER — LIDOCAINE HYDROCHLORIDE 20 MG/ML
INJECTION, SOLUTION INFILTRATION; PERINEURAL PRN
Status: DISCONTINUED | OUTPATIENT
Start: 2023-12-13 | End: 2023-12-13

## 2023-12-13 RX ORDER — PROPOFOL 10 MG/ML
INJECTION, EMULSION INTRAVENOUS CONTINUOUS PRN
Status: DISCONTINUED | OUTPATIENT
Start: 2023-12-13 | End: 2023-12-13

## 2023-12-13 RX ORDER — HYDROMORPHONE HCL IN WATER/PF 6 MG/30 ML
0.4 PATIENT CONTROLLED ANALGESIA SYRINGE INTRAVENOUS EVERY 5 MIN PRN
Status: DISCONTINUED | OUTPATIENT
Start: 2023-12-13 | End: 2023-12-13 | Stop reason: HOSPADM

## 2023-12-13 RX ORDER — CALCIUM CARBONATE 500 MG/1
500 TABLET, CHEWABLE ORAL 4 TIMES DAILY PRN
Status: DISCONTINUED | OUTPATIENT
Start: 2023-12-13 | End: 2023-12-14 | Stop reason: HOSPADM

## 2023-12-13 RX ORDER — ACETAMINOPHEN 325 MG/1
650 TABLET ORAL EVERY 4 HOURS PRN
Qty: 100 TABLET | Refills: 0 | Status: SHIPPED | OUTPATIENT
Start: 2023-12-13

## 2023-12-13 RX ORDER — IBUPROFEN 600 MG/1
600 TABLET, FILM COATED ORAL EVERY 6 HOURS PRN
Status: DISCONTINUED | OUTPATIENT
Start: 2023-12-13 | End: 2023-12-14 | Stop reason: HOSPADM

## 2023-12-13 RX ORDER — OXYCODONE HYDROCHLORIDE 5 MG/1
10 TABLET ORAL
Status: DISCONTINUED | OUTPATIENT
Start: 2023-12-13 | End: 2023-12-13 | Stop reason: HOSPADM

## 2023-12-13 RX ORDER — SODIUM CHLORIDE, SODIUM LACTATE, POTASSIUM CHLORIDE, CALCIUM CHLORIDE 600; 310; 30; 20 MG/100ML; MG/100ML; MG/100ML; MG/100ML
INJECTION, SOLUTION INTRAVENOUS CONTINUOUS
Status: DISCONTINUED | OUTPATIENT
Start: 2023-12-13 | End: 2023-12-14 | Stop reason: HOSPADM

## 2023-12-13 RX ORDER — FENTANYL CITRATE 50 UG/ML
25 INJECTION, SOLUTION INTRAMUSCULAR; INTRAVENOUS EVERY 5 MIN PRN
Status: DISCONTINUED | OUTPATIENT
Start: 2023-12-13 | End: 2023-12-13 | Stop reason: HOSPADM

## 2023-12-13 RX ORDER — ASPIRIN 81 MG/1
81 TABLET ORAL 2 TIMES DAILY
Status: DISCONTINUED | OUTPATIENT
Start: 2023-12-13 | End: 2023-12-14 | Stop reason: HOSPADM

## 2023-12-13 RX ORDER — FENTANYL CITRATE 50 UG/ML
INJECTION, SOLUTION INTRAMUSCULAR; INTRAVENOUS PRN
Status: DISCONTINUED | OUTPATIENT
Start: 2023-12-13 | End: 2023-12-13

## 2023-12-13 RX ORDER — ONDANSETRON 4 MG/1
4 TABLET, ORALLY DISINTEGRATING ORAL EVERY 6 HOURS PRN
Status: DISCONTINUED | OUTPATIENT
Start: 2023-12-13 | End: 2023-12-14 | Stop reason: HOSPADM

## 2023-12-13 RX ORDER — LIDOCAINE 40 MG/G
CREAM TOPICAL
Status: DISCONTINUED | OUTPATIENT
Start: 2023-12-13 | End: 2023-12-14 | Stop reason: HOSPADM

## 2023-12-13 RX ORDER — PROPOFOL 10 MG/ML
INJECTION, EMULSION INTRAVENOUS PRN
Status: DISCONTINUED | OUTPATIENT
Start: 2023-12-13 | End: 2023-12-13

## 2023-12-13 RX ORDER — HYDROMORPHONE HCL IN WATER/PF 6 MG/30 ML
0.2 PATIENT CONTROLLED ANALGESIA SYRINGE INTRAVENOUS
Status: DISCONTINUED | OUTPATIENT
Start: 2023-12-13 | End: 2023-12-14 | Stop reason: HOSPADM

## 2023-12-13 RX ORDER — HYDROMORPHONE HCL IN WATER/PF 6 MG/30 ML
0.2 PATIENT CONTROLLED ANALGESIA SYRINGE INTRAVENOUS EVERY 5 MIN PRN
Status: DISCONTINUED | OUTPATIENT
Start: 2023-12-13 | End: 2023-12-13 | Stop reason: HOSPADM

## 2023-12-13 RX ORDER — IBUPROFEN 600 MG/1
600 TABLET, FILM COATED ORAL EVERY 6 HOURS PRN
Qty: 30 TABLET | Refills: 0 | Status: SHIPPED | OUTPATIENT
Start: 2023-12-13

## 2023-12-13 RX ORDER — OXYCODONE HYDROCHLORIDE 5 MG/1
5 TABLET ORAL
Status: DISCONTINUED | OUTPATIENT
Start: 2023-12-13 | End: 2023-12-13 | Stop reason: HOSPADM

## 2023-12-13 RX ORDER — BISACODYL 10 MG
10 SUPPOSITORY, RECTAL RECTAL DAILY PRN
Status: DISCONTINUED | OUTPATIENT
Start: 2023-12-13 | End: 2023-12-14 | Stop reason: HOSPADM

## 2023-12-13 RX ORDER — OXYCODONE HYDROCHLORIDE 5 MG/1
TABLET ORAL
Qty: 16 TABLET | Refills: 0 | Status: SHIPPED | OUTPATIENT
Start: 2023-12-13

## 2023-12-13 RX ORDER — PROCHLORPERAZINE MALEATE 10 MG
10 TABLET ORAL EVERY 6 HOURS PRN
Status: DISCONTINUED | OUTPATIENT
Start: 2023-12-13 | End: 2023-12-14 | Stop reason: HOSPADM

## 2023-12-13 RX ORDER — AMOXICILLIN 250 MG
1 CAPSULE ORAL 2 TIMES DAILY
Status: DISCONTINUED | OUTPATIENT
Start: 2023-12-13 | End: 2023-12-14 | Stop reason: HOSPADM

## 2023-12-13 RX ADMIN — AMPICILLIN SODIUM AND SULBACTAM SODIUM 3 G: 2; 1 INJECTION, POWDER, FOR SOLUTION INTRAMUSCULAR; INTRAVENOUS at 12:14

## 2023-12-13 RX ADMIN — ONDANSETRON 4 MG: 2 INJECTION INTRAMUSCULAR; INTRAVENOUS at 18:22

## 2023-12-13 RX ADMIN — ACETAMINOPHEN 650 MG: 325 TABLET, FILM COATED ORAL at 00:03

## 2023-12-13 RX ADMIN — PROPOFOL 150 MCG/KG/MIN: 10 INJECTION, EMULSION INTRAVENOUS at 18:22

## 2023-12-13 RX ADMIN — SENNOSIDES AND DOCUSATE SODIUM 1 TABLET: 8.6; 5 TABLET ORAL at 21:34

## 2023-12-13 RX ADMIN — SODIUM CHLORIDE, POTASSIUM CHLORIDE, SODIUM LACTATE AND CALCIUM CHLORIDE: 600; 310; 30; 20 INJECTION, SOLUTION INTRAVENOUS at 21:34

## 2023-12-13 RX ADMIN — MIDAZOLAM 2 MG: 1 INJECTION INTRAMUSCULAR; INTRAVENOUS at 18:20

## 2023-12-13 RX ADMIN — ASPIRIN 81 MG: 81 TABLET, COATED ORAL at 21:34

## 2023-12-13 RX ADMIN — PROPOFOL 30 MG: 10 INJECTION, EMULSION INTRAVENOUS at 18:52

## 2023-12-13 RX ADMIN — Medication 5 MG: at 18:54

## 2023-12-13 RX ADMIN — PROPOFOL 50 MG: 10 INJECTION, EMULSION INTRAVENOUS at 18:22

## 2023-12-13 RX ADMIN — GLYCOPYRROLATE 0.2 MG: 0.2 INJECTION, SOLUTION INTRAMUSCULAR; INTRAVENOUS at 18:24

## 2023-12-13 RX ADMIN — FENTANYL CITRATE 50 MCG: 50 INJECTION INTRAMUSCULAR; INTRAVENOUS at 18:22

## 2023-12-13 RX ADMIN — AMPICILLIN SODIUM AND SULBACTAM SODIUM 3 G: 2; 1 INJECTION, POWDER, FOR SOLUTION INTRAMUSCULAR; INTRAVENOUS at 00:02

## 2023-12-13 RX ADMIN — OXYCODONE HYDROCHLORIDE 5 MG: 5 TABLET ORAL at 23:38

## 2023-12-13 RX ADMIN — LIDOCAINE HYDROCHLORIDE 30 MG: 20 INJECTION, SOLUTION INFILTRATION; PERINEURAL at 18:22

## 2023-12-13 RX ADMIN — ACETAMINOPHEN 650 MG: 325 TABLET, FILM COATED ORAL at 23:46

## 2023-12-13 RX ADMIN — Medication 15 MG: at 18:22

## 2023-12-13 RX ADMIN — ACETAMINOPHEN 650 MG: 325 TABLET, FILM COATED ORAL at 14:11

## 2023-12-13 RX ADMIN — AMPICILLIN SODIUM AND SULBACTAM SODIUM 3 G: 2; 1 INJECTION, POWDER, FOR SOLUTION INTRAMUSCULAR; INTRAVENOUS at 18:00

## 2023-12-13 RX ADMIN — SODIUM CHLORIDE, POTASSIUM CHLORIDE, SODIUM LACTATE AND CALCIUM CHLORIDE: 600; 310; 30; 20 INJECTION, SOLUTION INTRAVENOUS at 17:53

## 2023-12-13 RX ADMIN — AMPICILLIN SODIUM AND SULBACTAM SODIUM 3 G: 2; 1 INJECTION, POWDER, FOR SOLUTION INTRAMUSCULAR; INTRAVENOUS at 06:40

## 2023-12-13 RX ADMIN — AMPICILLIN SODIUM AND SULBACTAM SODIUM 3 G: 2; 1 INJECTION, POWDER, FOR SOLUTION INTRAMUSCULAR; INTRAVENOUS at 23:46

## 2023-12-13 ASSESSMENT — ACTIVITIES OF DAILY LIVING (ADL)
ADLS_ACUITY_SCORE: 19

## 2023-12-13 NOTE — PROGRESS NOTES
PRIMARY DIAGNOSIS: ACUTE PAIN/INFECTION  OUTPATIENT/OBSERVATION GOALS TO BE MET BEFORE DISCHARGE:  1. Pain Status: Improved-controlled with oral pain medications.     2. Return to near baseline physical activity: Yes     3. Cleared for discharge by consultants (if involved): No        Discharge Planner Nurse   Safe discharge environment identified: Yes  Barriers to discharge: Yes     Pt admitted with right index finger infection from a cat bite. He is A/O x4, up independent in the room. Pt is receiving Unasyn for ABX. R finger is red/swollen and bite jonathan seen on finger, also redness present to inside of RUE. VSS, afebrile. Seen by Orthopedics who recommend soaking BID with warm water and Chlorhexidine. First soak completed at 1500. NPO at midnight. Tolerating regular diet. Orthopedics following.      Please review provider order for any additional goals.   Nurse to notify provider when observation goals have been met and patient is ready for discharge.

## 2023-12-13 NOTE — PROGRESS NOTES
"[facility]    Medicine Progress Note - Hospitalist Service    Date of Admission:  12/12/2023    Assessment & Plan       Cat bite of right index finger with puncture and swelling, cellulitis, lymphangitis   Sepsis 2/2 above   Bite occurred 11/29/23, developed body aches and fever with R index finger swelling and purulent discharge from wound; was seen at urgent care 12/1 and given and completed 10 day course of Augmentin, however then noted erythema extending up R arm with pain. XR with normal joint space, soft tissue present. Continues to have fever, leukocytosis; on unsayn with improvement of lymphangitic streaking but suspect need source control.   -ortho following, OR today  -continue unasyn, dependent on OR findings will then tailor as able       Diet: NPO per Anesthesia Guidelines for Procedure/Surgery Except for: Meds    DVT Prophylaxis: Low Risk/Ambulatory with no VTE prophylaxis indicated  Brown Catheter: Not present  Lines: None     Cardiac Monitoring: ACTIVE order. Indication: Procedural area  Code Status: Full Code      Clinically Significant Risk Factors Present on Admission                       # Obesity: Estimated body mass index is 39.49 kg/m  as calculated from the following:    Height as of this encounter: 1.956 m (6' 5\").    Weight as of this encounter: 151 kg (333 lb).              Disposition Plan      Expected Discharge Date: 12/15/2023                  Mgegan Traore MD  Hospitalist Service  [facility]  Securely message with [email or web address] (more info)  Text page via [employer] Paging/Directory   ______________________________________________________________________    Interval History   Redness improving, still having pain and sweling, fever    Physical Exam   Vital Signs: Temp: 100.2  F (37.9  C) Temp src: Tympanic BP: 136/87 Pulse: 95   Resp: 20 SpO2: 96 % O2 Device: None (Room air)    Weight: 333 lbs 0 oz    General: Very pleasant male , NAD  HEENT:  " Sclerae anicteric.  Mucous membranes moist.  Cardiac: Regular rate and rhythm without murmur.  no peripheral edema.  Respiratory: Normal work of breathing.  Musculoskeletal: puncutre wound present on palmar aspect right index finger, with swelling extending between pip and dip joints and decreased flexion both active and passively  Skin: erythema still present up medial aspect of arm to trunk but improved from prior  Neurologic: Alert. No gross focal deficits.   Psychologic: Appropriate mood and affect.        Medical Decision Making       50 MINUTES SPENT BY ME on the date of service doing chart review, history, exam, documentation & further activities per the note.      Data     I have personally reviewed the following data over the past 24 hrs:    15.0 (H)  \   12.3 (L)   / 466 (H)     137 103 7.9 /  110 (H)   3.9 25 0.67 \

## 2023-12-13 NOTE — PROGRESS NOTES
Orthopedic Surgery  Rejirekha Lutzt  12/13/2023     Admit Date:  12/12/2023    Right index finger infection secondary to cat bite.      Patient resting comfortably in bed.    States pain similar to yesterday   NPO for possible procedure.   Denies nausea or vomiting  Denies chest pain or shortness of breath  102 fever overnight    Temp:  [98  F (36.7  C)-102.2  F (39  C)] 102.2  F (39  C)  Pulse:  [] 89  Resp:  [16-18] 18  BP: (128-142)/(63-83) 131/70  SpO2:  [90 %-100 %] 96 %    On exam of the right index finger: there is a bite jonathan present along the palmar side of the finger between the PIP and DIP joints.  No active drainage.  There is surrounding erythema, slightly improved vs yesterday.  Lymphangitis remains present along the proximal arm with tenderness.  No joint involvement.  Denies tenderness along the DIP and PIP.  Denies pain with resisted finger flexion and extension.  Sensation intact.  Brisk cap refill.       Labs:  Recent Labs   Lab Test 12/13/23  0700 12/12/23  0349 12/11/23  2158   WBC 15.0* 12.0* 11.6*   HGB 12.3* 12.5* 13.0*   * 497* 532*       1. PLAN:   Given fever of 102+ and increasing WBC, and continued lymphangitis, patient is scheduled for an I&D of the right index finger later today.     Surgeon: Dr Kwan   NPO effective now   Continue IV Abx and hand soaks     2. Disposition   Anticipate d/c to home 1-2 days pending Abx plan     Aura Brock PA-C

## 2023-12-13 NOTE — PLAN OF CARE
"PRIMARY DIAGNOSIS: ACUTE PAIN/Infection  OUTPATIENT/OBSERVATION GOALS TO BE MET BEFORE DISCHARGE:  1. Pain Status: Pain free.    2. Return to near baseline physical activity: Yes    3. Cleared for discharge by consultants (if involved): No    Discharge Planner Nurse   Safe discharge environment identified: Yes  Barriers to discharge: Yes       Entered by: Iram Mcelroy RN 12/12/2023 9:41 PM  A&Ox4, denies pain, reg diet, NPO at 12am, stable on RA, wife at bedside.   BP (!) 142/77   Pulse 83   Temp 98.7  F (37.1  C) (Oral)   Resp 16   Ht 1.956 m (6' 5\")   Wt (!) 151 kg (333 lb)   SpO2 98%   BMI 39.49 kg/m       Please review provider order for any additional goals.   Nurse to notify provider when observation goals have been met and patient is ready for discharge.  "

## 2023-12-13 NOTE — PLAN OF CARE
"PRIMARY DIAGNOSIS: ACUTE PAIN/Infection  OUTPATIENT/OBSERVATION GOALS TO BE MET BEFORE DISCHARGE:  1. Pain Status: Pain free.    2. Return to near baseline physical activity: Yes    3. Cleared for discharge by consultants (if involved): No    Discharge Planner Nurse   Safe discharge environment identified: Yes  Barriers to discharge: Yes       Entered by: Iram Mcelroy RN 12/13/2023 1:20 AM  A&Ox4, denies pain,NPO, wound cares completed, stable on RA, Ind in room, PIV SL, wife at bedside, temp 100.5, tylenol given.   BP (!) 142/77   Pulse 109   Temp (!) 100.5  F (38.1  C) (Oral)   Resp 18   Ht 1.956 m (6' 5\")   Wt (!) 151 kg (333 lb)   SpO2 98%   BMI 39.49 kg/m       Please review provider order for any additional goals.   Nurse to notify provider when observation goals have been met and patient is ready for discharge.  "

## 2023-12-13 NOTE — UTILIZATION REVIEW
Admission Status; Secondary Review Determination         Under the authority of the Utilization Management Committee, the utilization review process indicated a secondary review on the above patient.  The review outcome is based on review of the medical records, discussions with staff, and applying clinical experience noted on the date of the review.        (x)      Inpatient Status Appropriate - This patient's medical care is consistent with medical management for inpatient care and reasonable inpatient medical practice.     RATIONALE FOR DETERMINATION   The patient is a 28-year-old male admitted on 12/12/2023.  Patient came to the ED because of right index finger swelling and pain after a cat bite.  He was bitten by a stray kitten on his right index finger on 11/29/2023.  He developed body aches with fever.  There was purulent drainage is stopped 2 days ago.  Swelling continued.  Initial white count was 11.6.  Patient was started on broad-spectrum antibiotics and evaluated by hand surgery.  Patient is continued to worsen with 102 fever in the past 24 hours and white count is increased to 15,000.  Because of worsening clinical condition with systemic findings despite IV Zosyn, the patient will likely be taken to the OR later today and is currently NPO.  Based on worsening infection despite appropriate treatment, recommend changing from observation to inpatient status.  Aura Brock PA-C will be advised of this recommendation via [facility].      The severity of illness, intensity of service provided, expected LOS and risk for adverse outcome make the care complex, high risk and appropriate for hospital admission.        The information on this document is developed by the utilization review team in order for the business office to ensure compliance.  This only denotes the appropriateness of proper admission status and does not reflect the quality of care rendered.         The definitions of Inpatient Status and  Observation Status used in making the determination above are those provided in the CMS Coverage Manual, Chapter 1 and Chapter 6, section 70.4.      Sincerely,     Alek Donaldson MD  Physician Advisor  Utilization Review/ Case Management  [facility].

## 2023-12-13 NOTE — PLAN OF CARE
PRIMARY DIAGNOSIS: ACUTE PAIN/Infection  OUTPATIENT/OBSERVATION GOALS TO BE MET BEFORE DISCHARGE:  1. Pain Status: Pain free.    2. Return to near baseline physical activity: Yes    3. Cleared for discharge by consultants (if involved): No    Discharge Planner Nurse   Safe discharge environment identified: Yes  Barriers to discharge: Yes       Entered by: Iram Mcelroy RN 12/13/2023 4:20 AM  A&Ox4, denies pain,N/V, NPO diet started at midnight, BID chlorhexidine soak completed, stable on RA, LS clear, Ind in room, PIV SL, wife at bedside, PRN tylenol given for temp. Abx therapy, hand elevation encouraged, anticipating discharge home 12/13 if medically stable.     Please review provider order for any additional goals.   Nurse to notify provider when observation goals have been met and patient is ready for discharge.

## 2023-12-13 NOTE — ANESTHESIA PREPROCEDURE EVALUATION
Anesthesia Pre-Procedure Evaluation    Patient: Reji Savage   MRN: [id number] : 1995        Procedure : Procedure(s):  IRRIGATION AND DEBRIDEMENT RIGHT INDEX FINGER          History reviewed. No pertinent past medical history.   History reviewed. No pertinent surgical history.   No Known Allergies   Social History     Tobacco Use    Smoking status: Never    Smokeless tobacco: Never   Substance Use Topics    Alcohol use: Not on file      Wt Readings from Last 1 Encounters:   23 (!) 151 kg (333 lb)        Anesthesia Evaluation            ROS/MED HX  ENT/Pulmonary:  - neg pulmonary ROS     Neurologic:  - neg neurologic ROS     Cardiovascular:  - neg cardiovascular ROS     METS/Exercise Tolerance: >4 METS    Hematologic:  - neg hematologic  ROS     Musculoskeletal:  - neg musculoskeletal ROS     GI/Hepatic:  - neg GI/hepatic ROS     Renal/Genitourinary:  - neg Renal ROS     Endo: Comment: .Body mass index is 39.49 kg/m .        Psychiatric/Substance Use:  - neg psychiatric ROS     Infectious Disease: Comment: Cat bite on finger      Malignancy:  - neg malignancy ROS     Other:  - neg other ROS          Physical Exam    Airway        Mallampati: II    Neck ROM: full     Respiratory Devices and Support         Dental           Cardiovascular   cardiovascular exam normal       Rhythm and rate: regular     Pulmonary   pulmonary exam normal                OUTSIDE LABS:  CBC:   Lab Results   Component Value Date    WBC 15.0 (H) 2023    WBC 12.0 (H) 2023    HGB 12.3 (L) 2023    HGB 12.5 (L) 2023    HCT 36.8 (L) 2023    HCT 36.9 (L) 2023     (H) 2023     (H) 2023     BMP:   Lab Results   Component Value Date     2023     2023    POTASSIUM 3.9 2023    POTASSIUM 3.7 2023    CHLORIDE 103 2023    CHLORIDE 105 2023    CO2 25 2023    CO2 25 2023    BUN 7.9 2023    BUN 10.8 2023     "CR 0.67 12/13/2023    CR 0.64 (L) 12/12/2023     (H) 12/13/2023     (H) 12/12/2023     COAGS: No results found for: \"PTT\", \"INR\", \"FIBR\"  POC: No results found for: \"BGM\", \"HCG\", \"HCGS\"  HEPATIC: No results found for: \"ALBUMIN\", \"PROTTOTAL\", \"ALT\", \"AST\", \"GGT\", \"ALKPHOS\", \"BILITOTAL\", \"BILIDIRECT\", \"AURELIO\"  OTHER:   Lab Results   Component Value Date    LACT 1.1 12/12/2023    LOBITO 8.3 (L) 12/13/2023       Anesthesia Plan    ASA Status:  2       Anesthesia Type: MAC.              Consents    Anesthesia Plan(s) and associated risks, benefits, and realistic alternatives discussed. Questions answered and patient/representative(s) expressed understanding.     - Discussed:     - Discussed with:  Patient            Postoperative Care    Pain management: IV analgesics, Oral pain medications, Multi-modal analgesia.   PONV prophylaxis: Ondansetron (or other 5HT-3)     Comments:               Sheng Johnston, DO    I have reviewed the pertinent notes and labs in the chart from the past 30 days and (re)examined the patient.  Any updates or changes from those notes are reflected in this note.              # Obesity: Estimated body mass index is 39.49 kg/m  as calculated from the following:    Height as of this encounter: 1.956 m (6' 5\").    Weight as of this encounter: 151 kg (333 lb).      "

## 2023-12-14 ENCOUNTER — APPOINTMENT (OUTPATIENT)
Dept: OCCUPATIONAL THERAPY | Facility: CLINIC | Age: 28
DRG: 855 | End: 2023-12-14
Attending: PHYSICIAN ASSISTANT
Payer: COMMERCIAL

## 2023-12-14 VITALS
DIASTOLIC BLOOD PRESSURE: 69 MMHG | OXYGEN SATURATION: 96 % | RESPIRATION RATE: 16 BRPM | HEIGHT: 77 IN | SYSTOLIC BLOOD PRESSURE: 130 MMHG | TEMPERATURE: 98 F | BODY MASS INDEX: 37.19 KG/M2 | HEART RATE: 80 BPM | WEIGHT: 315 LBS

## 2023-12-14 LAB
ERYTHROCYTE [DISTWIDTH] IN BLOOD BY AUTOMATED COUNT: 11.9 % (ref 10–15)
GLUCOSE BLDC GLUCOMTR-MCNC: 97 MG/DL (ref 70–99)
HCT VFR BLD AUTO: 36.5 % (ref 40–53)
HGB BLD-MCNC: 12.2 G/DL (ref 13.3–17.7)
MCH RBC QN AUTO: 30.1 PG (ref 26.5–33)
MCHC RBC AUTO-ENTMCNC: 33.4 G/DL (ref 31.5–36.5)
MCV RBC AUTO: 90 FL (ref 78–100)
PLATELET # BLD AUTO: 427 10E3/UL (ref 150–450)
RBC # BLD AUTO: 4.05 10E6/UL (ref 4.4–5.9)
WBC # BLD AUTO: 17.2 10E3/UL (ref 4–11)

## 2023-12-14 PROCEDURE — 250N000011 HC RX IP 250 OP 636: Mod: JZ | Performed by: INTERNAL MEDICINE

## 2023-12-14 PROCEDURE — 250N000013 HC RX MED GY IP 250 OP 250 PS 637: Performed by: PHYSICIAN ASSISTANT

## 2023-12-14 PROCEDURE — 99239 HOSP IP/OBS DSCHRG MGMT >30: CPT | Performed by: STUDENT IN AN ORGANIZED HEALTH CARE EDUCATION/TRAINING PROGRAM

## 2023-12-14 PROCEDURE — 97110 THERAPEUTIC EXERCISES: CPT | Mod: GO | Performed by: REHABILITATION PRACTITIONER

## 2023-12-14 PROCEDURE — 85027 COMPLETE CBC AUTOMATED: CPT | Performed by: STUDENT IN AN ORGANIZED HEALTH CARE EDUCATION/TRAINING PROGRAM

## 2023-12-14 PROCEDURE — 97165 OT EVAL LOW COMPLEX 30 MIN: CPT | Mod: GO | Performed by: REHABILITATION PRACTITIONER

## 2023-12-14 PROCEDURE — 36415 COLL VENOUS BLD VENIPUNCTURE: CPT | Performed by: STUDENT IN AN ORGANIZED HEALTH CARE EDUCATION/TRAINING PROGRAM

## 2023-12-14 RX ORDER — OXYCODONE HYDROCHLORIDE 5 MG/1
5 TABLET ORAL EVERY 4 HOURS PRN
Qty: 6 TABLET | Refills: 0 | Status: SHIPPED | OUTPATIENT
Start: 2023-12-14

## 2023-12-14 RX ADMIN — AMPICILLIN SODIUM AND SULBACTAM SODIUM 3 G: 2; 1 INJECTION, POWDER, FOR SOLUTION INTRAMUSCULAR; INTRAVENOUS at 05:37

## 2023-12-14 RX ADMIN — ASPIRIN 81 MG: 81 TABLET, COATED ORAL at 08:22

## 2023-12-14 RX ADMIN — AMPICILLIN SODIUM AND SULBACTAM SODIUM 3 G: 2; 1 INJECTION, POWDER, FOR SOLUTION INTRAMUSCULAR; INTRAVENOUS at 11:46

## 2023-12-14 ASSESSMENT — ACTIVITIES OF DAILY LIVING (ADL)
ADLS_ACUITY_SCORE: 19
ADLS_ACUITY_SCORE: 18
ADLS_ACUITY_SCORE: 18
IADL_COMMENTS: SPOUSE TO A AS NEEDED
ADLS_ACUITY_SCORE: 18

## 2023-12-14 NOTE — PLAN OF CARE
Goal Outcome Evaluation:      Plan of Care Reviewed With: patient    Overall Patient Progress: improvingOverall Patient Progress: improving    Outcome Evaluation: .stable    Provided care [id number]    Vitals VSS, BP slightly elevated  Neuro A&Ox4  Respiratory 99% RA  Cardiac/Tele WDL  GI/ Continent. BM today per pt  Skin Intact. Cat bite at R index finger wrapped post I&D today   LDAs LR infusing via PIV @ 150 mL/hr. 2nd PIV SL. Unasyn Q6hr  Labs WBC 15  Diet Clear, advance as tolerated   Activity Ind  Plan Continue with ABX treatment. Will discharge with oral Augmentin.

## 2023-12-14 NOTE — PROGRESS NOTES
Orthopedic Surgery  Reji Savage  12/14/2023     Admit Date:  12/12/2023    POD: 1 Day Post-Op   Procedure(s):  IRRIGATION AND DEBRIDEMENT RIGHT INDEX FLEXOR SHEATH     Patient resting comfortably in bed.    Pain controlled.  Tolerating oral intake.    Denies nausea or vomiting  Denies chest pain or shortness of breath    Temp:  [97.8  F (36.6  C)-100.8  F (38.2  C)] 99.4  F (37.4  C)  Pulse:  [75-95] 85  Resp:  [15-20] 18  BP: (115-150)/(72-99) 115/72  SpO2:  [94 %-100 %] 96 %    Alert and oriented  Dressing on finger intact  Lymphangitis improved  Sensation intact  Brisk cap refill       Labs:  Recent Labs   Lab Test 12/14/23  0727 12/13/23  0700 12/12/23  0349   WBC 17.2* 15.0* 12.0*   HGB 12.2* 12.3* 12.5*    466* 497*     Cultures pending    1. PLAN:   WBC remains elevated. Reviewed with Dr Kwan.  Recommend patient receive noon Abx IV dose and can then discharge.    Continue IV abx while in hospital.  Augmentin at discharge.       Continue current pain regiment.   Dressings: Keep intact.  Change if >60% saturated or peeling off.    Follow-up: December 26th with Dr Kwan team    2. Disposition   Anticipate d/c to home later today on Augmentin after 1 additional dose of Unasyn and if afebrile.      Aura Brock PA-C

## 2023-12-14 NOTE — ANESTHESIA CARE TRANSFER NOTE
Patient: Reji Savage    Procedure: Procedure(s):  IRRIGATION AND DEBRIDEMENT RIGHT INDEX FLEXOR SHEATH       Diagnosis: Cat bite, initial encounter [W55.01XA]  Diagnosis Additional Information: No value filed.    Anesthesia Type:   MAC     Note:    Oropharynx: oropharynx clear of all foreign objects  Level of Consciousness: awake and drowsy  Oxygen Supplementation: face mask  Level of Supplemental Oxygen (L/min / FiO2): 6  Independent Airway: airway patency satisfactory and stable  Dentition: dentition unchanged  Vital Signs Stable: post-procedure vital signs reviewed and stable  Report to RN Given: handoff report given  Patient transferred to: PACU    Handoff Report: Identifed the Patient, Identified the Reponsible Provider, Reviewed the pertinent medical history, Discussed the surgical course, Reviewed Intra-OP anesthesia mangement and issues during anesthesia, Set expectations for post-procedure period and Allowed opportunity for questions and acknowledgement of understanding      Vitals:  Vitals Value Taken Time   /62    Temp 37    Pulse 82    Resp 10    SpO2 98        Electronically Signed By: CULLEN Spivey CRNA  December 13, 2023  7:22 PM

## 2023-12-14 NOTE — PLAN OF CARE
Goal Outcome Evaluation:  Pt has rested this shift, PRN oxycodone given for pain in right hand/finger.  Ice applied and hand elevated on pillow.  CMS intact, did c/o mild numbness of right middle finger.  Dressing C/D/I, no drainage noted.  Iv site and fluids maintained, meds given as ordered.  Pt did have low grade temp, tylenol given.  Lungs clear throughout, BS+x4, passing flatus.  Tolerating diet, no nausea.  Will continue with antibiotics and pain management.  Pt being followed by PT/OT.  Plan for discharge on 12-15.  Will continue to monitor.

## 2023-12-14 NOTE — ANESTHESIA POSTPROCEDURE EVALUATION
Patient: Reji Savage    Procedure: Procedure(s):  IRRIGATION AND DEBRIDEMENT RIGHT INDEX FLEXOR SHEATH       Anesthesia Type:  MAC    Note:     Postop Pain Control: Uneventful            Sign Out: Well controlled pain   PONV: No   Neuro/Psych: Uneventful            Sign Out: Acceptable/Baseline neuro status   Airway/Respiratory: Uneventful            Sign Out: Acceptable/Baseline resp. status   CV/Hemodynamics: Uneventful            Sign Out: Acceptable CV status   Other NRE: NONE   DID A NON-ROUTINE EVENT OCCUR? No           Last vitals:  Vitals Value Taken Time   /90 12/13/23 1950   Temp 98.9  F (37.2  C) 12/13/23 1918   Pulse 81 12/13/23 1950   Resp 16 12/13/23 1918   SpO2 99 % 12/13/23 1956   Vitals shown include unfiled device data.    Electronically Signed By: Glen Healy MD  December 13, 2023  7:57 PM

## 2023-12-14 NOTE — PROGRESS NOTES
12/14/23 0837   Appointment Info   Signing Clinician's Name / Credentials (OT) Glendy Carvalho OTR/L   Living Environment   People in Home spouse   Living Environment Comments patient reports no concerns for return home with spouse support.   Self-Care   Usual Activity Tolerance excellent   Current Activity Tolerance good   Equipment Currently Used at Home none   Activity/Exercise/Self-Care Comment Patient was I with ADL/IADls, works as an    Instrumental Activities of Daily Living (IADL)   IADL Comments spouse to A as needed   General Information   Onset of Illness/Injury or Date of Surgery 12/12/23   Referring Physician Kaya Evans PA-C   Patient/Family Therapy Goal Statement (OT) hopes to return home today   Additional Occupational Profile Info/Pertinent History of Current Problem patient is POD #1 R index I&D of flexor sheath dt  cat bite with infected flexor sheath   Existing Precautions/Restrictions no known precautions/restrictions   General Observations and Info Per order- Should work on index finger MP, PIP, and DIP joint flexion/extension blocking exercises in dressing 5 to 8 times/day.   Cognitive Status Examination   Orientation Status orientation to person, place and time   Visual Perception   Visual Impairment/Limitations WFL   Pain Assessment   Patient Currently in Pain Yes, see Vital Sign flowsheet  (minimal pain, rating not provided)   Posture   Posture not impaired   Range of Motion Comprehensive   Comment, General Range of Motion per orders-  Can use hand as tolerated, Limited motion in R index finger d/t dressings   Strength Comprehensive (MMT)   General Manual Muscle Testing (MMT) Assessment no strength deficits identified   Comment, General Manual Muscle Testing (MMT) Assessment limited use of R index finger d/t dressings, able to use rest of R hand functionally.   Muscle Tone Assessment   Muscle Tone Quick Adds No deficits were identified   Coordination   Upper Extremity  Coordination No deficits were identified   Bed Mobility   Bed Mobility No deficits identified   Transfers   Transfers No deficits identified   Balance   Balance Assessment no deficits identified   Activities of Daily Living   BADL Assessment/Intervention no deficits identified   Clinical Impression   Criteria for Skilled Therapeutic Interventions Met (OT) Yes, treatment indicated   OT Diagnosis limited use of R index finger from I&D and wound dressings   OT Problem List-Impairments impacting ADL range of motion (ROM)   Assessment of Occupational Performance 1-3 Performance Deficits   Identified Performance Deficits higher level IADLs and work duties   Planned Therapy Interventions (OT) home program guidelines   Clinical Decision Making Complexity (OT) problem focused assessment/low complexity   OT Total Evaluation Time   OT Eval, Low Complexity Minutes (96341) 5   OT Goals   Therapy Frequency (OT) One time eval and treatment   OT Predicted Duration/Target Date for Goal Attainment 12/14/23   OT Goals OT Goal 1   OT: Goal 1 Patient to be I with HeP for R index finger. goal met   OT Discharge Planning   OT Plan dc   OT Discharge Recommendation (DC Rec) home with assist   OT Rationale for DC Rec patient has met needed goals for safe discharge home with spouse support for ADLs and IADls.   OT Brief overview of current status Indp with basic ADLs and mobility, understands exercises.   Total Session Time   Total Session Time (sum of timed and untimed services) 5

## 2023-12-14 NOTE — DISCHARGE SUMMARY
"[facility]  Hospitalist Discharge Summary      Date of Admission:  12/12/2023  Date of Discharge:  12/14/2023  Discharging Provider: Meggan Traore MD  Discharge Service: Hospitalist Service    Discharge Diagnoses   Elli bite of R index finger complicated by SSTI, tenosynovitis, lymphangitis  Sepsis 2/2 above, resolved    Clinically Significant Risk Factors     # Obesity: Estimated body mass index is 39.49 kg/m  as calculated from the following:    Height as of this encounter: 1.956 m (6' 5\").    Weight as of this encounter: 151 kg (333 lb).       Follow-ups Needed After Discharge   Follow-up Appointments     Follow-up and recommended labs and tests       Follow-up with Dr. Kwan at [facility] on 12/26/23 at   9:15am in [place].  Call the care coordinator at [phone] if any   questions.    [facility] clinic numbers:  [place] [phone], [place] [phone], [place]   [phone]  Walk in clinic hours: 8 am - 8 pm            Unresulted Labs Ordered in the Past 30 Days of this Admission       Date and Time Order Name Status Description    12/13/2023  6:47 PM Wound Aerobic Bacterial Culture Routine In process     12/13/2023  6:47 PM Anaerobic Bacterial Culture Routine In process     12/12/2023  1:31 AM Blood Culture Arm, Right Preliminary     12/12/2023  1:31 AM Blood Culture Peripheral Blood Preliminary     12/11/2023  9:32 PM Blood Culture Arm, Right Preliminary         These results will be followed up by ortho    Discharge Disposition   Discharged to home  Condition at discharge: Stable    Hospital Course   Presented following with infection following bite by cat that occurred 11/29/23, developed body aches and fever with R index finger swelling and purulent discharge from wound; was seen at urgent care 12/1 and given and completed 10 day course of Augmentin, however then noted erythema extending up R arm with pain. XR with normal joint space, soft tissue present. Continues to " "have fever, leukocytosis; on unsayn with improvement of lymphangitic streaking but suspect needed source control given fever, increasing WBC, and decreased ROM of PIP with purulence.  S/p I&D 12/13 with rich pus and necrotic fat at site of bite, irrigated and area of bite left open to drain. Discharging on course of Augmentin and follow up with ortho    Consultations This Hospital Stay   ORTHOPEDIC SURGERY IP CONSULT  OCCUPATIONAL THERAPY ADULT IP CONSULT  PHYSICAL THERAPY ADULT IP CONSULT    Code Status   Full Code    Time Spent on this Encounter   I, Meggan Traore MD, personally saw the patient today and spent greater than 30 minutes discharging this patient.       Meggan Traore MD  [facility]  [address] [place] [address]  Phone: [phone]  Fax: [phone]  ______________________________________________________________________    Physical Exam   Vital Signs: Temp: 98  F (36.7  C) Temp src: Oral BP: 130/69 Pulse: 80   Resp: 16 SpO2: 96 % O2 Device: None (Room air) Oxygen Delivery: 7 LPM  Weight: 333 lbs 0 oz         Primary Care Physician   Physician No Ref-Primary    Discharge Orders      When to call - Contact Surgeon Team    You may experience symptoms that require follow-up before your scheduled appointment. Refer to the \"Stoplight Tool\" for instructions on when to contact your Surgeon Team if you are concerned about pain control, blood clots, constipation, or if you are unable to urinate.     When to call - Reach out to Urgent Care    If you are not able to reach your Surgeon Team and you need immediate care, go to the Orthopedic Walk-in Clinic or Urgent Care at your Surgeon's office.  Do NOT go to the Emergency Room unless you have shortness of breath, chest pain, or other signs of a medical emergency.     When to call - Reasons to Call 911    Call 911 immediately if you experience sudden-onset chest pain, arm weakness/numbness, slurred speech, or shortness " of breath     Discharge Instruction - Breathing exercises    Perform breathing exercises using your Incentive Spirometer 10 times per hour while awake for 2 weeks.     Symptoms - Fever Management    A low grade fever can be expected after surgery.  Use acetaminophen (TYLENOL) as needed for fever management.  Contact your Surgeon Team if you have a fever greater than 101.5 F, chills, and/or night sweats.     Symptoms - Constipation management    Constipation (hard, dry bowel movements) is expected after surgery due to the combination of being less active, the anesthetic, and the opioid pain medication.  You can do the following to help reduce constipation:  ~  FLUIDS:  Drink clear liquids (water or Gatorade), or juice (apple/prune).  ~  DIET:  Eat a fiber rich diet.    ~  ACTIVITY:  Get up and move around several times a day.  Increase your activity as you are able.  MEDICATIONS:  Reduce the risk of constipation by starting medications before you are constipated.  You can take Miralax   (1 packet as directed) and/or a stool softener (Senokot 1-2 tablets 1-2 times a day).  If you already have constipation and these medications are not working, you can get magnesium citrate and use as directed.  If you continue to have constipation you can try an over the counter suppository or enema.  Call your Surgeon Team if it has been greater than 3 days since your last bowel movement.     Symptoms - Reduced Urine Output    Changes in the amount of fluids you drank before and after surgery may result in problems urinating.  It is important to stay well-hydrated after surgery and drink plenty of water. If it has been greater than 8 hours since you have urinated despite drinking plenty of water, call your Surgeon Team.     Activity - Exercises to prevent blood clots    Unless otherwise directed by your Surgeon team, perform the following exercises at least three times per day for the first four weeks after surgery to prevent blood  clots in your legs: 1) Point and flex your feet (Ankle Pumps), 2) Move your ankle around in big circles, 3) Wiggle your toes, 4) Walk, even for short distances, several times a day, will help decrease the risk of blood clots.     Comfort and Pain Management - Pain after Surgery    Pain after surgery is normal and expected.  You will have some amount of pain for several weeks after surgery.  Your pain will improve with time.  There are several things you can do to help reduce your pain including: rest, ice, elevation, and using pain medications as needed. Contact your Surgeon Team if you have pain that persists or worsens after surgery despite rest, ice, elevation, and taking your medication(s) as prescribed. Contact your Surgeon Team if you have new numbness, tingling, or weakness in your operative extremity.     Comfort and Pain Management - Swelling after Surgery    Swelling and/or bruising of the surgical extremity is common and may persist for several months after surgery. In addition to frequent icing and elevation, gentle compressive support with an ACE wrap or tubigrip may help with swelling. Apply compression regularly, removing at least twice daily to perform skin checks. Contact your Surgeon Team if your swelling increases and is NOT associated with an increase in your activity level, or if your swelling increases and is associated with redness and pain.     Comfort and Pain Management - Cold therapy    Ice can be used to control swelling and discomfort after surgery. Place a thin towel over your operative site and apply the ice pack overtop. Leave ice pack in place for 20 minutes, then remove for 20 minutes. Repeat this 20 minutes on/20 minutes off routine as often as tolerated.     Medication Instructions - Acetaminophen (TYLENOL) Instructions    You were discharged with acetaminophen (TYLENOL) for pain management after surgery. Acetaminophen most effectively manages pain symptoms when it is taken on a  schedule without missing doses (every four, six, or eight hours). Your Provider will prescribe a safe daily dose between 3000 - 4000 mg.  Do NOT exceed this daily dose. Most patients use acetaminophen for pain control for the first four weeks after surgery.  You can wean from this medication as your pain decreases.     Medication Instructions - NSAID Instructions    You were discharged with an anti-inflammatory medication for pain management to use in combination with acetaminophen (TYLENOL) and the narcotic pain medication.  Take this medication exactly as directed.  You should only take one anti-inflammatory at a time.  Some common anti-inflammatories include: ibuprofen (ADVIL, MOTRIN), naproxen (ALEVE, NAPROSYN), celecoxib (CELEBREX), meloxicam (MOBIC), ketorolac (TORADOL).  Take this medication with food and water.     Medication Instructions - Opioids - Tapering Instructions    In the first three days following surgery, your symptoms may warrant use of the narcotic pain medication every four to six hours as prescribed. This is normal. As your pain symptoms improve, focus your efforts on decreasing (tapering) use of narcotic medications. The most successful tapering strategy is to first, decrease the number of tablets you take every 4-6 hours to the minimum prescribed. Then, increase the amount of time between doses.  For example:  First, taper to   or 1 tablet every 4-6 hours.  Then, taper to   or 1 tablet every 6-8 hours.  Then, taper to   or 1 tablet every 8-10 hours.  Then, taper to   or 1 tablet every 10-12 hours.  Then, taper to   or 1 tablet at bedtime.  The bedtime dose can help with comfort during sleep and is typically the last dose to be discontinued after surgery.     Medication instructions - No pharmacologic VTE prophylaxis prescribed    Your Surgeon did not prescribe medication for anticoagulation.     Comfort and Pain Management - UPPER extremity Elevation    Swelling is expected for several  months after surgery. This type of swelling is usually associated with gravity and activity, and can be improved with elevation.   The best way to do this is to get your hand above your heart by sitting down, resting your elbow on a pillow or arm rest, with your hand in the air. Perform this elevation as often as possible especially for the first two weeks after surgery     Medication Instructions - Opioid Instructions (Less than 65 years)    You were discharged with an opioid medication (hydromorphone, oxycodone, hydrocodone, or tramadol). This medication should only be taken for breakthrough pain that is not controlled with acetaminophen (TYLENOL). If you rate your pain less than 3 you do not need this medication.  Pain rating 0-3:  You do not need this medication.  Pain rating 4-6:  Take 1 tablet every 4-6 hours as needed  Pain rating 7-10:  Take 2 tablets every 4-6 hours as needed.  Do not exceed 6 tablets per day     Reason for your hospital stay    Right index finger infection     Follow-up and recommended labs and tests     Follow-up with Dr. Kwan at [facility] on 12/26/23 at 9:15am in [place].  Call the care coordinator at [phone] if any questions.    [facility] clinic numbers:  [place] [phone], [place] [phone], [place] [phone]  Walk in clinic hours: 8 am - 8 pm     Activity    Your activity upon discharge: no heavy lifting with right hand     When to contact your care team    Call if increasing pain, redness, drainage, cramping, fever >101.     Wound care and dressings    Instructions to care for your wound at home: Site: right finger   Keep dressings clean, dry and intact.   Do not immerse     Discharge Instruction - Regular Diet Adult    Return to your pre-surgery diet unless instructed otherwise       Significant Results and Procedures   Most Recent 3 CBC's:  Recent Labs   Lab Test 12/14/23  0727 12/13/23  0700 12/12/23  0349   WBC 17.2* 15.0* 12.0*   HGB 12.2* 12.3* 12.5*    MCV 90 90 89    466* 497*   ,   Results for orders placed or performed during the hospital encounter of 12/12/23   XR Finger Right G/E 2 Views    Narrative    EXAM: XR FINGER RIGHT G/E 2 VIEWS  LOCATION: [facility]  DATE: 12/12/2023    INDICATION: cat bite with infection  COMPARISON: None.      Impression    IMPRESSION: Normal joint spaces and alignment. No fracture. Soft tissue swelling about the second digit. No soft tissue gas or radiopaque foreign body.         Discharge Medications   Current Discharge Medication List        START taking these medications    Details   acetaminophen (TYLENOL) 325 MG tablet Take 2 tablets (650 mg) by mouth every 4 hours as needed for other (mild pain)  Qty: 100 tablet, Refills: 0    Associated Diagnoses: Cat bite, initial encounter      ibuprofen (ADVIL/MOTRIN) 600 MG tablet Take 1 tablet (600 mg) by mouth every 6 hours as needed for mild pain  Qty: 30 tablet, Refills: 0    Associated Diagnoses: Cat bite, initial encounter      !! oxyCODONE (ROXICODONE) 5 MG tablet Take 1 tablet (5 mg) by mouth every 4 hours as needed for moderate pain  Qty: 6 tablet, Refills: 0    Associated Diagnoses: Cat bite, initial encounter; Cellulitis of finger of right hand; Lymphangitis      !! oxyCODONE (ROXICODONE) 5 MG tablet Take 1 to 2 tablets every 4 to 6 hours for pain if needed.  Qty: 16 tablet, Refills: 0    Associated Diagnoses: Cat bite, initial encounter       !! - Potential duplicate medications found. Please discuss with provider.        CONTINUE these medications which have CHANGED    Details   amoxicillin-clavulanate (AUGMENTIN) 875-125 MG tablet Take 1 tablet by mouth 2 times daily  Qty: 14 tablet, Refills: 0    Associated Diagnoses: Cat bite, initial encounter           Allergies   No Known Allergies

## 2023-12-14 NOTE — OP NOTE
Surgeon: Dr. Shital Kwan MD.  First assistant: none        Preoperative diagnosis:Cat bite with infected flexor sheath    Post-operative diagnosis: Same, cloudy fluid in sheath and some of cruciate pulley eaten away by infection    Procedure: Right index I&D of flexor sheath    Anesthesia: Local /MAC    Tourniquet time: under 30 minutes    Informed consent: Informed consent was obtained in the holding area.    Surgical site signed: The surgical site was signed by me prior to entering the OR.    Time out: A time-out was performed confirming the surgical site prior to incision.    Prophylactic antibiotics:Unasyn held until post procedure      Procedure and findings:   Indications the patient is a 28-year-old male who was bitten by a 5-week-old stray cat directly over the index flexor sheath at the PIP joint.  He was given 10 days of Augmentin and completed that and afterwards developed fever, increased pain and erythema spreading up his arm to the axilla.  He was admitted to the hospital and placed on IV antibiotics and continued to have fever and increased white count.  On physical exam he had purulent drainage from the cat bite over the PIP on the palmar surface he had no joint swelling but had tenderness along the flexor sheath.  The risk and benefits were described to the patient in the holding area and he fully consented to irrigation and debridement of the index finger flexor sheath.  He was brought to the operating room and given a digital block of Marcaine no epi across the base of the index finger proximal to the MP crease of the palm.  His arm was then prepped and draped with Betadine scrub and Betadine paint.  A timeout was performed and his arm was exsanguinated and the tourniquet inflated 150 mm above his systolic pressure.    1 incision was made over the middle phalanx directly where the bite wound was and it was carried across the PIP crease obliquely and towards the ulnar side.  The skin was  elevated up and reflected and held in place with a 4-0 nylon.  At the site of the bite there was rich pus and necrotic fat, that was debrided with tenotomy scissors and a small synovial rongeur.  There was a defect in the flexor sheath directly beneath the bite jonathan and cloudy fluid in the flexor compartment.  The only rich pus was directly at the insertion of the bite jonathan.  Some of the soft tissue was debrided with the tenotomy scissors and placed on the back table in case we needed it for all cultures, a swab was sent for aerobic and anaerobic.  A second incision was made at the MP crease of the palm over the A1 pulley.  The A1 pulley was released proximally and a propofol tubing was placed down the sheath for irrigation purposes.  Double antibiotic solution saline was irrigated through the flexor sheath with more than 200 cc and the fluid was clear at the end of the irrigation.  The tubing was removed, the tourniquet was let down and any small bleeders were cauterized.  The skin was closed with a 4-0 nylon at the palm incision and at the PIP joint the skin was closed with 4-0 nylon but the area of the tooth jonathan was left open to drain.  Bacitracin and Adaptic were applied as well as a Kerlix dressing and Coban.    Postoperatively I would continue the IV Unasyn through the evening.  He may be discharged to home on Augmentin again and pain medications as needed.  I would encourage digital range of motion to ensure he does not develop adhesions of the index finger.  We will call him if his cultures demonstrate a different organism requiring a different antibiotic.  I instructed his wife to encourage him to drink kombutcha or yogurt to replenish his normal gut bacteria.  He will have follow-up in clinic with me December 26 and will keep his dressing clean and dry.

## 2023-12-14 NOTE — PLAN OF CARE
Discharged home with spouse assisting and transporting as needed. Belongings returned, discharge medications provided, discharge instructions provided. Pt and family did not have any questions or concerns noted upon departure.

## 2023-12-14 NOTE — PROGRESS NOTES
Occupational Therapy Discharge Summary    Reason for therapy discharge:    All goals and outcomes met, no further needs identified.    Progress towards therapy goal(s). See goals on Care Plan in [facility] electronic health record for goal details.  Goals met    Therapy recommendation(s):    No further therapy is recommended.  Continue home exercise program.

## 2023-12-15 LAB — BACTERIA WND CULT: ABNORMAL

## 2023-12-16 ENCOUNTER — NURSE TRIAGE (OUTPATIENT)
Dept: NURSING | Facility: CLINIC | Age: 28
End: 2023-12-16
Payer: COMMERCIAL

## 2023-12-17 LAB
BACTERIA BLD CULT: NO GROWTH

## 2023-12-17 NOTE — TELEPHONE ENCOUNTER
"Nurse Triage SBAR    Is this a 2nd Level Triage? NO    Situation: Allergic Reaction to Medication    Background: :Patient was prescribed Augmentin for cellulitis.    Assessment: Patient's SO (verbal CTC given by patient) calling to report suspected allergy to Augmentin. Patient is having sore in his mouth, several canker sores <1/8\" large and sore throat. He reports throat feels tight, denies difficulty breathing or swallowing. He does have an on-going fever, currently 100.9.     Protocol Recommended Disposition:   According to the protocol, patient should see provider within 4 hours (or PCP triage), no [facility] PCP, advised to go to ED or contact surgeon.  Care advice given. Patient verbalizes understanding and agrees with plan of care. Transferred to [facility].    Jennifer Lester RN  12/16/23 9:49 PM  [facility] Nurse Advisor    Reason for Disposition   [1] Drug allergy suspected AND [2] taking prescription medicine AND [3] widespread rash   Bloody crusts on lips or sores in mouth    Additional Information   Negative: Difficulty breathing or wheezing   Negative: [1] Life-threatening reaction (anaphylaxis) in the past to the same drug AND [2] < 2 hours since exposure   Negative: [1] Hoarseness or cough AND [2] started soon after 1st dose of drug   Negative: [1] Swollen tongue AND [2] started soon after 1st dose of drug   Negative: [1] Purple or blood-colored rash (spots or dots) AND [2] fever   Negative: Sounds like a life-threatening emergency to the triager   Negative: Rash is only on 1 part of the body (localized)   Negative: Taking new non-prescription (OTC) antihistamine, decongestant, ear drops, eye drops, or other OTC cough/cold medicine   Negative: Taking new prescription antihistamine, allergy medicine, asthma medicine, eye drops, ear drops or nose drops   Negative: Rash started more than 3 days after stopping new prescription medicine   Negative: Swollen tongue   Negative: [1] Widespread " hives AND [2] onset < 2 hours of exposure to 1st dose of drug   Negative: Fever   Negative: Patient sounds very sick or weak to the triager   Negative: [1] Purple or blood-colored rash (spots or dots) AND [2] no fever AND [3] sounds well to triager   Negative: [1] Intentional drug overdose AND [2] suicidal thoughts or ideas   Negative: Drug overdose and triager unable to answer question   Negative: Caller requesting a renewal or refill of a medicine patient is currently taking   Negative: Caller requesting information unrelated to medicine   Negative: Caller requesting information about COVID-19 Vaccine   Negative: Caller requesting information about Emergency Contraception   Negative: Caller requesting information about Combined Birth Control Pills   Negative: Caller requesting information about Progestin Birth Control Pills   Negative: Caller requesting information about Post-Op pain or medicines   Negative: Caller requesting a prescription antibiotic (such as Penicillin) for Strep throat and has a positive culture result   Negative: Caller requesting a prescription anti-viral med (such as Tamiflu) and has influenza (flu) symptoms   Negative: Immunization reaction suspected   Negative: Rash while taking a medicine or within 3 days of stopping it   Negative: [1] Asthma and [2] having symptoms of asthma (cough, wheezing, etc.)   Negative: [1] Symptom of illness (e.g., headache, abdominal pain, earache, vomiting) AND [2] more than mild   Negative: Breastfeeding questions about mother's medicines and diet   Negative: [1] Taking new prescription medication AND [2] rash within 4 hours of 1st dose   Negative: Large or small blisters on skin (i.e., fluid filled bubbles or sacs)    Protocols used: Allergic Reactions - Guideline [id number], Medication Question Call-A-, Rash - Widespread On Drugs-A-AH

## 2023-12-20 LAB
BACTERIA WND CULT: ABNORMAL
BACTERIA WND CULT: ABNORMAL

## (undated) DEVICE — PREP POVIDONE-IODINE 7.5% SCRUB 4OZ BOTTLE MDS093945

## (undated) DEVICE — TOURNIQUET CUFF 24" YELLOW LF 5921-024-135

## (undated) DEVICE — DRSG KERLIX 4 1/2"X4YDS ROLL 6730

## (undated) DEVICE — LINEN FULL SHEET 5511

## (undated) DEVICE — BAG CLEAR TRASH 1.3M 39X33" P4040C

## (undated) DEVICE — SU ETHILON 4-0 PS-2 18" BLACK 1667H

## (undated) DEVICE — SU CHROMIC 3-0 RB-1 27" U204H

## (undated) DEVICE — SOL WATER IRRIG 1000ML BOTTLE 2F7114

## (undated) DEVICE — SUCTION CANISTER MEDIVAC LINER 3000ML W/LID 65651-530

## (undated) DEVICE — TUBING SUCTION 12"X1/4" N612

## (undated) DEVICE — BNDG KLING 2" 2231

## (undated) DEVICE — CAST BUCKET

## (undated) DEVICE — PREP POVIDONE IODINE SOLUTION 10% 4OZ BOTTLE 29906-004

## (undated) DEVICE — PACK HAND SOP32HARMO

## (undated) DEVICE — LINEN HALF SHEET 5512

## (undated) DEVICE — TRAY PREP DRY SKIN SCRUB 067

## (undated) DEVICE — BNDG ELASTIC 4"X5YDS UNSTERILE 6611-40

## (undated) DEVICE — DRSG ADAPTIC 3X8" 6113

## (undated) DEVICE — DRSG ABDOMINAL 07 1/2X8" 7197D

## (undated) RX ORDER — BUPIVACAINE HYDROCHLORIDE 5 MG/ML
INJECTION, SOLUTION EPIDURAL; INTRACAUDAL
Status: DISPENSED
Start: 2023-12-13

## (undated) RX ORDER — FENTANYL CITRATE 50 UG/ML
INJECTION, SOLUTION INTRAMUSCULAR; INTRAVENOUS
Status: DISPENSED
Start: 2023-12-13

## (undated) RX ORDER — GINSENG 100 MG
CAPSULE ORAL
Status: DISPENSED
Start: 2023-12-13

## (undated) RX ORDER — BUPIVACAINE HYDROCHLORIDE AND EPINEPHRINE 5; 5 MG/ML; UG/ML
INJECTION, SOLUTION EPIDURAL; INTRACAUDAL; PERINEURAL
Status: DISPENSED
Start: 2023-12-13